# Patient Record
Sex: MALE | Race: WHITE | NOT HISPANIC OR LATINO | URBAN - METROPOLITAN AREA
[De-identification: names, ages, dates, MRNs, and addresses within clinical notes are randomized per-mention and may not be internally consistent; named-entity substitution may affect disease eponyms.]

---

## 2022-09-30 ENCOUNTER — INPATIENT (INPATIENT)
Facility: HOSPITAL | Age: 47
LOS: 5 days | Discharge: NURSING, REHAB OR LONG-TERM CARE | End: 2022-10-06
Attending: HOSPITALIST | Admitting: HOSPITALIST

## 2022-09-30 VITALS
SYSTOLIC BLOOD PRESSURE: 101 MMHG | RESPIRATION RATE: 20 BRPM | DIASTOLIC BLOOD PRESSURE: 60 MMHG | TEMPERATURE: 103 F | HEART RATE: 115 BPM | OXYGEN SATURATION: 98 %

## 2022-09-30 DIAGNOSIS — Z98.890 OTHER SPECIFIED POSTPROCEDURAL STATES: Chronic | ICD-10-CM

## 2022-09-30 DIAGNOSIS — Z29.9 ENCOUNTER FOR PROPHYLACTIC MEASURES, UNSPECIFIED: ICD-10-CM

## 2022-09-30 DIAGNOSIS — E11.9 TYPE 2 DIABETES MELLITUS WITHOUT COMPLICATIONS: ICD-10-CM

## 2022-09-30 DIAGNOSIS — Z98.1 ARTHRODESIS STATUS: Chronic | ICD-10-CM

## 2022-09-30 DIAGNOSIS — M54.9 DORSALGIA, UNSPECIFIED: ICD-10-CM

## 2022-09-30 DIAGNOSIS — N12 TUBULO-INTERSTITIAL NEPHRITIS, NOT SPECIFIED AS ACUTE OR CHRONIC: ICD-10-CM

## 2022-09-30 DIAGNOSIS — F32.9 MAJOR DEPRESSIVE DISORDER, SINGLE EPISODE, UNSPECIFIED: ICD-10-CM

## 2022-09-30 DIAGNOSIS — I10 ESSENTIAL (PRIMARY) HYPERTENSION: ICD-10-CM

## 2022-09-30 DIAGNOSIS — D64.9 ANEMIA, UNSPECIFIED: ICD-10-CM

## 2022-09-30 DIAGNOSIS — N39.0 URINARY TRACT INFECTION, SITE NOT SPECIFIED: ICD-10-CM

## 2022-09-30 DIAGNOSIS — N31.9 NEUROMUSCULAR DYSFUNCTION OF BLADDER, UNSPECIFIED: ICD-10-CM

## 2022-09-30 DIAGNOSIS — R77.8 OTHER SPECIFIED ABNORMALITIES OF PLASMA PROTEINS: ICD-10-CM

## 2022-09-30 DIAGNOSIS — I50.42 CHRONIC COMBINED SYSTOLIC (CONGESTIVE) AND DIASTOLIC (CONGESTIVE) HEART FAILURE: ICD-10-CM

## 2022-09-30 DIAGNOSIS — K21.9 GASTRO-ESOPHAGEAL REFLUX DISEASE WITHOUT ESOPHAGITIS: ICD-10-CM

## 2022-09-30 DIAGNOSIS — G47.33 OBSTRUCTIVE SLEEP APNEA (ADULT) (PEDIATRIC): ICD-10-CM

## 2022-09-30 LAB
A1C WITH ESTIMATED AVERAGE GLUCOSE RESULT: 7.9 % — HIGH (ref 4–5.6)
ALBUMIN SERPL ELPH-MCNC: 3 G/DL — LOW (ref 3.3–5)
ALBUMIN SERPL ELPH-MCNC: 3.3 G/DL — SIGNIFICANT CHANGE UP (ref 3.3–5)
ALP SERPL-CCNC: 108 U/L — SIGNIFICANT CHANGE UP (ref 40–120)
ALP SERPL-CCNC: 119 U/L — SIGNIFICANT CHANGE UP (ref 40–120)
ALT FLD-CCNC: 5 U/L — SIGNIFICANT CHANGE UP (ref 4–41)
ALT FLD-CCNC: 5 U/L — SIGNIFICANT CHANGE UP (ref 4–41)
ANION GAP SERPL CALC-SCNC: 10 MMOL/L — SIGNIFICANT CHANGE UP (ref 7–14)
ANION GAP SERPL CALC-SCNC: 9 MMOL/L — SIGNIFICANT CHANGE UP (ref 7–14)
APPEARANCE UR: ABNORMAL
APTT BLD: 36.6 SEC — HIGH (ref 27–36.3)
AST SERPL-CCNC: 11 U/L — SIGNIFICANT CHANGE UP (ref 4–40)
AST SERPL-CCNC: 13 U/L — SIGNIFICANT CHANGE UP (ref 4–40)
BACTERIA # UR AUTO: ABNORMAL
BASE EXCESS BLDV CALC-SCNC: -0.2 MMOL/L — SIGNIFICANT CHANGE UP (ref -2–3)
BASE EXCESS BLDV CALC-SCNC: 0.2 MMOL/L — SIGNIFICANT CHANGE UP (ref -2–3)
BASOPHILS # BLD AUTO: 0.02 K/UL — SIGNIFICANT CHANGE UP (ref 0–0.2)
BASOPHILS NFR BLD AUTO: 0.2 % — SIGNIFICANT CHANGE UP (ref 0–2)
BILIRUB SERPL-MCNC: 0.8 MG/DL — SIGNIFICANT CHANGE UP (ref 0.2–1.2)
BILIRUB SERPL-MCNC: 0.8 MG/DL — SIGNIFICANT CHANGE UP (ref 0.2–1.2)
BILIRUB UR-MCNC: NEGATIVE — SIGNIFICANT CHANGE UP
BLD GP AB SCN SERPL QL: POSITIVE — SIGNIFICANT CHANGE UP
BLOOD GAS VENOUS COMPREHENSIVE RESULT: SIGNIFICANT CHANGE UP
BUN SERPL-MCNC: 16 MG/DL — SIGNIFICANT CHANGE UP (ref 7–23)
BUN SERPL-MCNC: 16 MG/DL — SIGNIFICANT CHANGE UP (ref 7–23)
CA-I SERPL-SCNC: 1.09 MMOL/L — LOW (ref 1.15–1.33)
CALCIUM SERPL-MCNC: 8.1 MG/DL — LOW (ref 8.4–10.5)
CALCIUM SERPL-MCNC: 8.2 MG/DL — LOW (ref 8.4–10.5)
CHLORIDE BLDV-SCNC: 100 MMOL/L — SIGNIFICANT CHANGE UP (ref 96–108)
CHLORIDE BLDV-SCNC: 99 MMOL/L — SIGNIFICANT CHANGE UP (ref 96–108)
CHLORIDE SERPL-SCNC: 102 MMOL/L — SIGNIFICANT CHANGE UP (ref 98–107)
CHLORIDE SERPL-SCNC: 97 MMOL/L — LOW (ref 98–107)
CO2 BLDV-SCNC: 25.8 MMOL/L — SIGNIFICANT CHANGE UP (ref 22–26)
CO2 BLDV-SCNC: 28.6 MMOL/L — HIGH (ref 22–26)
CO2 SERPL-SCNC: 25 MMOL/L — SIGNIFICANT CHANGE UP (ref 22–31)
CO2 SERPL-SCNC: 25 MMOL/L — SIGNIFICANT CHANGE UP (ref 22–31)
COLOR SPEC: YELLOW — SIGNIFICANT CHANGE UP
COMMENT - URINE: SIGNIFICANT CHANGE UP
CREAT SERPL-MCNC: 0.68 MG/DL — SIGNIFICANT CHANGE UP (ref 0.5–1.3)
CREAT SERPL-MCNC: 0.73 MG/DL — SIGNIFICANT CHANGE UP (ref 0.5–1.3)
DIFF PNL FLD: ABNORMAL
EGFR: 114 ML/MIN/1.73M2 — SIGNIFICANT CHANGE UP
EGFR: 117 ML/MIN/1.73M2 — SIGNIFICANT CHANGE UP
EOSINOPHIL # BLD AUTO: 0.02 K/UL — SIGNIFICANT CHANGE UP (ref 0–0.5)
EOSINOPHIL NFR BLD AUTO: 0.2 % — SIGNIFICANT CHANGE UP (ref 0–6)
EPI CELLS # UR: 3 /HPF — SIGNIFICANT CHANGE UP (ref 0–5)
ESTIMATED AVERAGE GLUCOSE: 180 — SIGNIFICANT CHANGE UP
FLUAV AG NPH QL: SIGNIFICANT CHANGE UP
FLUBV AG NPH QL: SIGNIFICANT CHANGE UP
GAS PNL BLDV: 130 MMOL/L — LOW (ref 136–145)
GAS PNL BLDV: 134 MMOL/L — LOW (ref 136–145)
GAS PNL BLDV: SIGNIFICANT CHANGE UP
GLUCOSE BLDV-MCNC: 107 MG/DL — HIGH (ref 70–99)
GLUCOSE BLDV-MCNC: 97 MG/DL — SIGNIFICANT CHANGE UP (ref 70–99)
GLUCOSE SERPL-MCNC: 100 MG/DL — HIGH (ref 70–99)
GLUCOSE SERPL-MCNC: 108 MG/DL — HIGH (ref 70–99)
GLUCOSE UR QL: NEGATIVE — SIGNIFICANT CHANGE UP
HCO3 BLDV-SCNC: 25 MMOL/L — SIGNIFICANT CHANGE UP (ref 22–29)
HCO3 BLDV-SCNC: 27 MMOL/L — SIGNIFICANT CHANGE UP (ref 22–29)
HCT VFR BLD CALC: 31.2 % — LOW (ref 39–50)
HCT VFR BLD CALC: 32 % — LOW (ref 39–50)
HCT VFR BLDA CALC: 28 % — LOW (ref 39–51)
HCT VFR BLDA CALC: 28 % — LOW (ref 39–51)
HGB BLD CALC-MCNC: 9.2 G/DL — LOW (ref 13–17)
HGB BLD CALC-MCNC: 9.3 G/DL — LOW (ref 13–17)
HGB BLD-MCNC: 9.1 G/DL — LOW (ref 13–17)
HGB BLD-MCNC: 9.1 G/DL — LOW (ref 13–17)
IANC: 6.38 K/UL — SIGNIFICANT CHANGE UP (ref 1.8–7.4)
IMM GRANULOCYTES NFR BLD AUTO: 0.5 % — SIGNIFICANT CHANGE UP (ref 0–0.9)
INR BLD: 1.4 RATIO — HIGH (ref 0.88–1.16)
KETONES UR-MCNC: NEGATIVE — SIGNIFICANT CHANGE UP
LACTATE BLDV-MCNC: 1.1 MMOL/L — SIGNIFICANT CHANGE UP (ref 0.5–2)
LACTATE BLDV-MCNC: 1.1 MMOL/L — SIGNIFICANT CHANGE UP (ref 0.5–2)
LEUKOCYTE ESTERASE UR-ACNC: ABNORMAL
LYMPHOCYTES # BLD AUTO: 1.05 K/UL — SIGNIFICANT CHANGE UP (ref 1–3.3)
LYMPHOCYTES # BLD AUTO: 12.1 % — LOW (ref 13–44)
MCHC RBC-ENTMCNC: 23.3 PG — LOW (ref 27–34)
MCHC RBC-ENTMCNC: 23.4 PG — LOW (ref 27–34)
MCHC RBC-ENTMCNC: 28.4 GM/DL — LOW (ref 32–36)
MCHC RBC-ENTMCNC: 29.2 GM/DL — LOW (ref 32–36)
MCV RBC AUTO: 80.2 FL — SIGNIFICANT CHANGE UP (ref 80–100)
MCV RBC AUTO: 82.1 FL — SIGNIFICANT CHANGE UP (ref 80–100)
MONOCYTES # BLD AUTO: 1.16 K/UL — HIGH (ref 0–0.9)
MONOCYTES NFR BLD AUTO: 13.4 % — SIGNIFICANT CHANGE UP (ref 2–14)
NEUTROPHILS # BLD AUTO: 6.38 K/UL — SIGNIFICANT CHANGE UP (ref 1.8–7.4)
NEUTROPHILS NFR BLD AUTO: 73.6 % — SIGNIFICANT CHANGE UP (ref 43–77)
NITRITE UR-MCNC: POSITIVE
NRBC # BLD: 0 /100 WBCS — SIGNIFICANT CHANGE UP (ref 0–0)
NRBC # BLD: 0 /100 WBCS — SIGNIFICANT CHANGE UP (ref 0–0)
NRBC # FLD: 0 K/UL — SIGNIFICANT CHANGE UP (ref 0–0)
NRBC # FLD: 0 K/UL — SIGNIFICANT CHANGE UP (ref 0–0)
PCO2 BLDV: 38 MMHG — LOW (ref 42–55)
PCO2 BLDV: 56 MMHG — HIGH (ref 42–55)
PH BLDV: 7.29 — LOW (ref 7.32–7.43)
PH BLDV: 7.42 — SIGNIFICANT CHANGE UP (ref 7.32–7.43)
PH UR: 8.5 — HIGH (ref 5–8)
PLATELET # BLD AUTO: 168 K/UL — SIGNIFICANT CHANGE UP (ref 150–400)
PLATELET # BLD AUTO: 184 K/UL — SIGNIFICANT CHANGE UP (ref 150–400)
PO2 BLDV: 29 MMHG — SIGNIFICANT CHANGE UP
PO2 BLDV: 55 MMHG — SIGNIFICANT CHANGE UP
POTASSIUM BLDV-SCNC: 3.3 MMOL/L — LOW (ref 3.5–5.1)
POTASSIUM BLDV-SCNC: 4.2 MMOL/L — SIGNIFICANT CHANGE UP (ref 3.5–5.1)
POTASSIUM SERPL-MCNC: 3.4 MMOL/L — LOW (ref 3.5–5.3)
POTASSIUM SERPL-MCNC: 4.3 MMOL/L — SIGNIFICANT CHANGE UP (ref 3.5–5.3)
POTASSIUM SERPL-SCNC: 3.4 MMOL/L — LOW (ref 3.5–5.3)
POTASSIUM SERPL-SCNC: 4.3 MMOL/L — SIGNIFICANT CHANGE UP (ref 3.5–5.3)
PROT SERPL-MCNC: 7.2 G/DL — SIGNIFICANT CHANGE UP (ref 6–8.3)
PROT SERPL-MCNC: 7.6 G/DL — SIGNIFICANT CHANGE UP (ref 6–8.3)
PROT UR-MCNC: ABNORMAL
PROTHROM AB SERPL-ACNC: 16.3 SEC — HIGH (ref 10.5–13.4)
RBC # BLD: 3.89 M/UL — LOW (ref 4.2–5.8)
RBC # BLD: 3.9 M/UL — LOW (ref 4.2–5.8)
RBC # FLD: 18.2 % — HIGH (ref 10.3–14.5)
RBC # FLD: 18.3 % — HIGH (ref 10.3–14.5)
RBC CASTS # UR COMP ASSIST: 4 /HPF — SIGNIFICANT CHANGE UP (ref 0–4)
RH IG SCN BLD-IMP: POSITIVE — SIGNIFICANT CHANGE UP
RSV RNA NPH QL NAA+NON-PROBE: SIGNIFICANT CHANGE UP
SAO2 % BLDV: 39.4 % — SIGNIFICANT CHANGE UP
SAO2 % BLDV: 85.1 % — SIGNIFICANT CHANGE UP
SARS-COV-2 RNA SPEC QL NAA+PROBE: SIGNIFICANT CHANGE UP
SODIUM SERPL-SCNC: 132 MMOL/L — LOW (ref 135–145)
SODIUM SERPL-SCNC: 136 MMOL/L — SIGNIFICANT CHANGE UP (ref 135–145)
SP GR SPEC: 1.02 — SIGNIFICANT CHANGE UP (ref 1.01–1.05)
TROPONIN T, HIGH SENSITIVITY RESULT: 58 NG/L — CRITICAL HIGH
TROPONIN T, HIGH SENSITIVITY RESULT: 66 NG/L — CRITICAL HIGH
UROBILINOGEN FLD QL: SIGNIFICANT CHANGE UP
WBC # BLD: 6.96 K/UL — SIGNIFICANT CHANGE UP (ref 3.8–10.5)
WBC # BLD: 8.67 K/UL — SIGNIFICANT CHANGE UP (ref 3.8–10.5)
WBC # FLD AUTO: 6.96 K/UL — SIGNIFICANT CHANGE UP (ref 3.8–10.5)
WBC # FLD AUTO: 8.67 K/UL — SIGNIFICANT CHANGE UP (ref 3.8–10.5)
WBC UR QL: 22 /HPF — HIGH (ref 0–5)

## 2022-09-30 PROCEDURE — 99285 EMERGENCY DEPT VISIT HI MDM: CPT | Mod: 25

## 2022-09-30 PROCEDURE — 71045 X-RAY EXAM CHEST 1 VIEW: CPT | Mod: 26

## 2022-09-30 PROCEDURE — 86077 PHYS BLOOD BANK SERV XMATCH: CPT

## 2022-09-30 PROCEDURE — 99223 1ST HOSP IP/OBS HIGH 75: CPT

## 2022-09-30 PROCEDURE — 93010 ELECTROCARDIOGRAM REPORT: CPT

## 2022-09-30 RX ORDER — BACLOFEN 100 %
5 POWDER (GRAM) MISCELLANEOUS EVERY 8 HOURS
Refills: 0 | Status: DISCONTINUED | OUTPATIENT
Start: 2022-09-30 | End: 2022-10-03

## 2022-09-30 RX ORDER — TRAMADOL HYDROCHLORIDE 50 MG/1
50 TABLET ORAL EVERY 8 HOURS
Refills: 0 | Status: DISCONTINUED | OUTPATIENT
Start: 2022-09-30 | End: 2022-10-06

## 2022-09-30 RX ORDER — LANOLIN ALCOHOL/MO/W.PET/CERES
6 CREAM (GRAM) TOPICAL AT BEDTIME
Refills: 0 | Status: DISCONTINUED | OUTPATIENT
Start: 2022-09-30 | End: 2022-10-06

## 2022-09-30 RX ORDER — SODIUM CHLORIDE 9 MG/ML
500 INJECTION INTRAMUSCULAR; INTRAVENOUS; SUBCUTANEOUS ONCE
Refills: 0 | Status: COMPLETED | OUTPATIENT
Start: 2022-09-30 | End: 2022-09-30

## 2022-09-30 RX ORDER — ENOXAPARIN SODIUM 100 MG/ML
40 INJECTION SUBCUTANEOUS EVERY 24 HOURS
Refills: 0 | Status: DISCONTINUED | OUTPATIENT
Start: 2022-09-30 | End: 2022-10-06

## 2022-09-30 RX ORDER — OXYCODONE HYDROCHLORIDE 5 MG/1
30 TABLET ORAL EVERY 6 HOURS
Refills: 0 | Status: DISCONTINUED | OUTPATIENT
Start: 2022-09-30 | End: 2022-10-06

## 2022-09-30 RX ORDER — DIVALPROEX SODIUM 500 MG/1
500 TABLET, DELAYED RELEASE ORAL EVERY 12 HOURS
Refills: 0 | Status: DISCONTINUED | OUTPATIENT
Start: 2022-09-30 | End: 2022-10-06

## 2022-09-30 RX ORDER — INSULIN ASPART 100 [IU]/ML
10 INJECTION, SOLUTION SUBCUTANEOUS
Qty: 0 | Refills: 0 | DISCHARGE

## 2022-09-30 RX ORDER — SODIUM CHLORIDE 9 MG/ML
1000 INJECTION, SOLUTION INTRAVENOUS
Refills: 0 | Status: DISCONTINUED | OUTPATIENT
Start: 2022-09-30 | End: 2022-10-06

## 2022-09-30 RX ORDER — INSULIN LISPRO 100/ML
10 VIAL (ML) SUBCUTANEOUS
Refills: 0 | Status: DISCONTINUED | OUTPATIENT
Start: 2022-09-30 | End: 2022-09-30

## 2022-09-30 RX ORDER — GLUCAGON INJECTION, SOLUTION 0.5 MG/.1ML
1 INJECTION, SOLUTION SUBCUTANEOUS ONCE
Refills: 0 | Status: DISCONTINUED | OUTPATIENT
Start: 2022-09-30 | End: 2022-10-06

## 2022-09-30 RX ORDER — GABAPENTIN 400 MG/1
400 CAPSULE ORAL EVERY 8 HOURS
Refills: 0 | Status: DISCONTINUED | OUTPATIENT
Start: 2022-09-30 | End: 2022-10-06

## 2022-09-30 RX ORDER — FUROSEMIDE 40 MG
80 TABLET ORAL DAILY
Refills: 0 | Status: DISCONTINUED | OUTPATIENT
Start: 2022-09-30 | End: 2022-10-06

## 2022-09-30 RX ORDER — CYCLOBENZAPRINE HYDROCHLORIDE 10 MG/1
10 TABLET, FILM COATED ORAL
Refills: 0 | Status: DISCONTINUED | OUTPATIENT
Start: 2022-09-30 | End: 2022-10-06

## 2022-09-30 RX ORDER — CHOLECALCIFEROL (VITAMIN D3) 125 MCG
1000 CAPSULE ORAL DAILY
Refills: 0 | Status: DISCONTINUED | OUTPATIENT
Start: 2022-09-30 | End: 2022-10-06

## 2022-09-30 RX ORDER — FENTANYL CITRATE 50 UG/ML
100 INJECTION INTRAVENOUS ONCE
Refills: 0 | Status: DISCONTINUED | OUTPATIENT
Start: 2022-09-30 | End: 2022-09-30

## 2022-09-30 RX ORDER — KETOROLAC TROMETHAMINE 30 MG/ML
30 SYRINGE (ML) INJECTION ONCE
Refills: 0 | Status: DISCONTINUED | OUTPATIENT
Start: 2022-09-30 | End: 2022-09-30

## 2022-09-30 RX ORDER — DULOXETINE HYDROCHLORIDE 30 MG/1
60 CAPSULE, DELAYED RELEASE ORAL DAILY
Refills: 0 | Status: DISCONTINUED | OUTPATIENT
Start: 2022-09-30 | End: 2022-10-06

## 2022-09-30 RX ORDER — ACETAMINOPHEN 500 MG
650 TABLET ORAL EVERY 6 HOURS
Refills: 0 | Status: DISCONTINUED | OUTPATIENT
Start: 2022-09-30 | End: 2022-10-06

## 2022-09-30 RX ORDER — SODIUM CHLORIDE 9 MG/ML
250 INJECTION INTRAMUSCULAR; INTRAVENOUS; SUBCUTANEOUS ONCE
Refills: 0 | Status: COMPLETED | OUTPATIENT
Start: 2022-09-30 | End: 2022-09-30

## 2022-09-30 RX ORDER — DEXTROSE 50 % IN WATER 50 %
25 SYRINGE (ML) INTRAVENOUS ONCE
Refills: 0 | Status: DISCONTINUED | OUTPATIENT
Start: 2022-09-30 | End: 2022-10-06

## 2022-09-30 RX ORDER — INSULIN LISPRO 100/ML
VIAL (ML) SUBCUTANEOUS AT BEDTIME
Refills: 0 | Status: DISCONTINUED | OUTPATIENT
Start: 2022-09-30 | End: 2022-10-06

## 2022-09-30 RX ORDER — INSULIN GLARGINE 100 [IU]/ML
60 INJECTION, SOLUTION SUBCUTANEOUS AT BEDTIME
Refills: 0 | Status: DISCONTINUED | OUTPATIENT
Start: 2022-09-30 | End: 2022-09-30

## 2022-09-30 RX ORDER — FENTANYL CITRATE 50 UG/ML
50 INJECTION INTRAVENOUS ONCE
Refills: 0 | Status: DISCONTINUED | OUTPATIENT
Start: 2022-09-30 | End: 2022-09-30

## 2022-09-30 RX ORDER — ERTAPENEM SODIUM 1 G/1
1000 INJECTION, POWDER, LYOPHILIZED, FOR SOLUTION INTRAMUSCULAR; INTRAVENOUS EVERY 24 HOURS
Refills: 0 | Status: DISCONTINUED | OUTPATIENT
Start: 2022-10-01 | End: 2022-10-06

## 2022-09-30 RX ORDER — SENNA PLUS 8.6 MG/1
2 TABLET ORAL AT BEDTIME
Refills: 0 | Status: DISCONTINUED | OUTPATIENT
Start: 2022-09-30 | End: 2022-10-03

## 2022-09-30 RX ORDER — LANOLIN ALCOHOL/MO/W.PET/CERES
5 CREAM (GRAM) TOPICAL AT BEDTIME
Refills: 0 | Status: DISCONTINUED | OUTPATIENT
Start: 2022-09-30 | End: 2022-09-30

## 2022-09-30 RX ORDER — ACETAMINOPHEN 500 MG
1000 TABLET ORAL ONCE
Refills: 0 | Status: COMPLETED | OUTPATIENT
Start: 2022-09-30 | End: 2022-09-30

## 2022-09-30 RX ORDER — METOPROLOL TARTRATE 50 MG
25 TABLET ORAL
Refills: 0 | Status: DISCONTINUED | OUTPATIENT
Start: 2022-09-30 | End: 2022-10-06

## 2022-09-30 RX ORDER — DEXTROSE 50 % IN WATER 50 %
25 SYRINGE (ML) INTRAVENOUS ONCE
Refills: 0 | Status: COMPLETED | OUTPATIENT
Start: 2022-09-30 | End: 2022-09-30

## 2022-09-30 RX ORDER — DEXTROSE 50 % IN WATER 50 %
12.5 SYRINGE (ML) INTRAVENOUS ONCE
Refills: 0 | Status: DISCONTINUED | OUTPATIENT
Start: 2022-09-30 | End: 2022-10-06

## 2022-09-30 RX ORDER — DEXTROSE 50 % IN WATER 50 %
15 SYRINGE (ML) INTRAVENOUS ONCE
Refills: 0 | Status: DISCONTINUED | OUTPATIENT
Start: 2022-09-30 | End: 2022-10-06

## 2022-09-30 RX ORDER — INSULIN LISPRO 100/ML
VIAL (ML) SUBCUTANEOUS
Refills: 0 | Status: DISCONTINUED | OUTPATIENT
Start: 2022-09-30 | End: 2022-10-06

## 2022-09-30 RX ORDER — INSULIN GLARGINE 100 [IU]/ML
30 INJECTION, SOLUTION SUBCUTANEOUS AT BEDTIME
Refills: 0 | Status: DISCONTINUED | OUTPATIENT
Start: 2022-09-30 | End: 2022-10-01

## 2022-09-30 RX ORDER — ERTAPENEM SODIUM 1 G/1
1000 INJECTION, POWDER, LYOPHILIZED, FOR SOLUTION INTRAMUSCULAR; INTRAVENOUS ONCE
Refills: 0 | Status: COMPLETED | OUTPATIENT
Start: 2022-09-30 | End: 2022-09-30

## 2022-09-30 RX ADMIN — GABAPENTIN 400 MILLIGRAM(S): 400 CAPSULE ORAL at 21:17

## 2022-09-30 RX ADMIN — ERTAPENEM SODIUM 120 MILLIGRAM(S): 1 INJECTION, POWDER, LYOPHILIZED, FOR SOLUTION INTRAMUSCULAR; INTRAVENOUS at 05:40

## 2022-09-30 RX ADMIN — Medication 6 MILLIGRAM(S): at 21:18

## 2022-09-30 RX ADMIN — Medication 30 MILLIGRAM(S): at 11:05

## 2022-09-30 RX ADMIN — DIVALPROEX SODIUM 500 MILLIGRAM(S): 500 TABLET, DELAYED RELEASE ORAL at 19:22

## 2022-09-30 RX ADMIN — FENTANYL CITRATE 50 MICROGRAM(S): 50 INJECTION INTRAVENOUS at 04:04

## 2022-09-30 RX ADMIN — INSULIN GLARGINE 30 UNIT(S): 100 INJECTION, SOLUTION SUBCUTANEOUS at 21:19

## 2022-09-30 RX ADMIN — TRAMADOL HYDROCHLORIDE 50 MILLIGRAM(S): 50 TABLET ORAL at 14:54

## 2022-09-30 RX ADMIN — OXYCODONE HYDROCHLORIDE 30 MILLIGRAM(S): 5 TABLET ORAL at 19:23

## 2022-09-30 RX ADMIN — FENTANYL CITRATE 50 MICROGRAM(S): 50 INJECTION INTRAVENOUS at 11:04

## 2022-09-30 RX ADMIN — FENTANYL CITRATE 100 MICROGRAM(S): 50 INJECTION INTRAVENOUS at 06:18

## 2022-09-30 RX ADMIN — GABAPENTIN 400 MILLIGRAM(S): 400 CAPSULE ORAL at 15:33

## 2022-09-30 RX ADMIN — ENOXAPARIN SODIUM 40 MILLIGRAM(S): 100 INJECTION SUBCUTANEOUS at 15:32

## 2022-09-30 RX ADMIN — TRAMADOL HYDROCHLORIDE 50 MILLIGRAM(S): 50 TABLET ORAL at 21:18

## 2022-09-30 RX ADMIN — TRAMADOL HYDROCHLORIDE 50 MILLIGRAM(S): 50 TABLET ORAL at 19:16

## 2022-09-30 RX ADMIN — Medication 5 MILLIGRAM(S): at 21:18

## 2022-09-30 RX ADMIN — FENTANYL CITRATE 50 MICROGRAM(S): 50 INJECTION INTRAVENOUS at 04:49

## 2022-09-30 RX ADMIN — Medication 5 MILLIGRAM(S): at 15:33

## 2022-09-30 RX ADMIN — Medication 30 MILLIGRAM(S): at 05:49

## 2022-09-30 RX ADMIN — Medication 25 GRAM(S): at 11:50

## 2022-09-30 RX ADMIN — Medication 30 MILLIGRAM(S): at 19:14

## 2022-09-30 RX ADMIN — Medication 1000 MILLIGRAM(S): at 11:04

## 2022-09-30 RX ADMIN — FENTANYL CITRATE 100 MICROGRAM(S): 50 INJECTION INTRAVENOUS at 11:04

## 2022-09-30 RX ADMIN — SODIUM CHLORIDE 500 MILLILITER(S): 9 INJECTION INTRAMUSCULAR; INTRAVENOUS; SUBCUTANEOUS at 11:05

## 2022-09-30 RX ADMIN — SENNA PLUS 2 TABLET(S): 8.6 TABLET ORAL at 21:18

## 2022-09-30 RX ADMIN — SODIUM CHLORIDE 250 MILLILITER(S): 9 INJECTION INTRAMUSCULAR; INTRAVENOUS; SUBCUTANEOUS at 05:30

## 2022-09-30 RX ADMIN — Medication 30 MILLIGRAM(S): at 14:53

## 2022-09-30 RX ADMIN — CYCLOBENZAPRINE HYDROCHLORIDE 10 MILLIGRAM(S): 10 TABLET, FILM COATED ORAL at 19:22

## 2022-09-30 RX ADMIN — CYCLOBENZAPRINE HYDROCHLORIDE 10 MILLIGRAM(S): 10 TABLET, FILM COATED ORAL at 21:18

## 2022-09-30 RX ADMIN — SODIUM CHLORIDE 250 MILLILITER(S): 9 INJECTION INTRAMUSCULAR; INTRAVENOUS; SUBCUTANEOUS at 03:25

## 2022-09-30 RX ADMIN — Medication 400 MILLIGRAM(S): at 03:25

## 2022-09-30 RX ADMIN — ERTAPENEM SODIUM 1000 MILLIGRAM(S): 1 INJECTION, POWDER, LYOPHILIZED, FOR SOLUTION INTRAMUSCULAR; INTRAVENOUS at 11:04

## 2022-09-30 RX ADMIN — SODIUM CHLORIDE 500 MILLILITER(S): 9 INJECTION INTRAMUSCULAR; INTRAVENOUS; SUBCUTANEOUS at 05:30

## 2022-09-30 RX ADMIN — Medication 650 MILLIGRAM(S): at 19:22

## 2022-09-30 NOTE — ED PROVIDER NOTE - OBJECTIVE STATEMENT
47 year old M with PMH paraplegia, opioid dependence and withdrawal, GERD, neuromuscular dysfunction of bladder with chronic indwelling corrigan, DM2, MDD, diastolic and systolic CHF, SALLIE, HTN, ileostomy, sacral ulcer [facility]. Patient states he has chronic pain. Is on tramadol which does not work. Had previously been on oxycodone but was taken off. Has chronic indwelling corrigan that was last exchanged 3 weeks ago, states he has been seeing pus in it. Also notes increased output in his ostomy. Denies CP, SOB, palpitations, N/V. 47 year old M with PMH paraplegia, opioid dependence and withdrawal, GERD, neuromuscular dysfunction of bladder with chronic indwelling corrigan, DM2, MDD, diastolic and systolic CHF, SALLIE, HTN, ileostomy, sacral ulcer [facility]. Patient states he has chronic pain. Is on tramadol which does not work. Had previously been on oxycodone but was taken off. Has chronic indwelling corrigan that was last exchanged 3 weeks ago, states he has been seeing pus in it. Also notes increased output in his ostomy. Denies CP, SOB, palpitations, N/V. Symptoms similar to UTI/pyelonephritis in the past. States has been told he has "resistant bacteria" in his urine but unsure of what antibiotic has worked in the past. No palliative or provocative factors. No other current complaints at this time.

## 2022-09-30 NOTE — H&P ADULT - NEGATIVE GENERAL GENITOURINARY SYMPTOMS
due to neuromuscular dysfunction of bladder patient does not feel anything/no renal colic/no incontinence/no dysuria/no urinary hesitancy/normal urinary frequency/no nocturia

## 2022-09-30 NOTE — H&P ADULT - NEGATIVE ENMT SYMPTOMS
no hearing difficulty/no ear pain/no tinnitus/no vertigo/no abnormal taste sensation/no dry mouth/no throat pain

## 2022-09-30 NOTE — PHYSICAL THERAPY INITIAL EVALUATION ADULT - NSPTDISCHREC_GEN_A_CORE
return to rehab, if patient wants to go home will need 24/7 home health. Patient is at his baseline, will not keep on PT program at this time.

## 2022-09-30 NOTE — H&P ADULT - GASTROINTESTINAL
RLQ colostomy present/normal/soft/nondistended/normal active bowel sounds/no guarding/no rigidity details…

## 2022-09-30 NOTE — ED ADULT TRIAGE NOTE - CHIEF COMPLAINT QUOTE
Pt from [facility], c/o generalized back pain, b/l flank pain and chills x1 day. PMH DM2, CHF Pt from [facility], c/o generalized back pain, b/l flank pain and chills x1 day. Arrived w/ 20FR Sol Medina. PMH DM2, CHF

## 2022-09-30 NOTE — ED ADULT NURSE REASSESSMENT NOTE - NS ED NURSE REASSESS COMMENT FT1
At 1000 Pt pale and diaphoretic showing sinus tach in the 140s on the cardiac monitor with a BP of 85/51. Spoke with ADARSH Solano. 1 AMP of D50% given. VS reassessed at 1003 with a HR of 100 and a BP of 102/50. Medina in place, size At 1000 Pt pale and diaphoretic showing sinus tach in the 140s on the cardiac monitor with a BP of 85/51. Spoke with ADARSH Solano. 1 AMP of D50% given. VS reassessed at 1003 with a HR of 100 and a BP of 102/50. 20 F corrigan in place, 250mL voided, urine cloudy and purulent, draining by gravity to collection bag. Colostomy in place in RUQ with minimal stool in bag, skin around dressing clean, dry, and intact.

## 2022-09-30 NOTE — ED PROVIDER NOTE - NSICDXPASTMEDICALHX_GEN_ALL_CORE_FT
PAST MEDICAL HISTORY:  Chronic CHF     DM (diabetes mellitus)     HTN (hypertension)     Paraplegic spinal paralysis

## 2022-09-30 NOTE — ED ADULT NURSE NOTE - OBJECTIVE STATEMENT
48 y/o M presents to ED room 4 A&Ox4 and nonambulatory, c/o flank pain, fevers, and back pain. pt is in a rehab center d/t home care being revoked. pt is a paraplegic. pt has had B/L flank pain, back pain and fevers for "a few days." as per pt, rehab center has not been listening to eddie so he called EMS. pt has 20F corrigan catheter placed 3 weeks ago, draining sediment. catheter insertion site is red and inflamed, yellow discharge noted. MD aware. as per pt, has a pressure ulcer on sacrum. endorses SOB, back pain, fevers, chills. denies c/p, N,V,D. abd soft and non distended. pt is tachycardic and febrile, septic workup initiated. 18G placed to R forearm. labs drawn and sent. awaiting results. safety maintained, side rails up. 48 y/o M presents to ED room 4 A&Ox4 and nonambulatory, c/o flank pain, fevers, and back pain. pt is in a rehab center d/t home care being revoked. pt is a paraplegic. pt has had B/L flank pain, back pain and fevers for "a few days." as per pt, rehab center has not been listening to eddie so he called EMS. pt has 20F corrigan catheter placed 3 weeks ago, draining sediment. catheter insertion site is red and inflamed, yellow discharge noted. MD aware. colostomy bag noted on R abd, stoma beefy red, draining well. as per pt, has a pressure ulcer on sacrum. endorses SOB, back pain, fevers, chills. denies c/p, N,V,D. abd soft and non distended. pt is tachycardic and febrile, septic workup initiated. 18G placed to R forearm. labs drawn and sent. awaiting results. safety maintained, side rails up.

## 2022-09-30 NOTE — H&P ADULT - PROBLEM SELECTOR PLAN 3
- h/o combined systolic/diastolic HF +1 pitting edema on Lasix 80 mg BID, Spironolactone, Metoprolol Tartrate 25 mg BID at rehab  - unknown status of CHF  - echocardiogram for assessment of EF  - continue Lasix 80 mg BID for now  - Check CBC, CMP, MAG, PHOS, TSH, FLP, HgA1C, BNP.  - Strict I&Os, monitor daily weights, 1500 cc fluid restriction, sodium restriction.  - CHF consult if needed

## 2022-09-30 NOTE — ED ADULT NURSE NOTE - CHIEF COMPLAINT QUOTE
Pt from [facility], c/o generalized back pain, b/l flank pain and chills x1 day. Arrived w/ 20FR Sol Medina. PMH DM2, CHF

## 2022-09-30 NOTE — H&P ADULT - NEGATIVE GASTROINTESTINAL SYMPTOMS
stated increased output on colostomy/no nausea/no vomiting/no diarrhea/no constipation/no melena/no hematochezia

## 2022-09-30 NOTE — H&P ADULT - PROBLEM SELECTOR PLAN 2
Patient with elevated Troponin of 58, 66  EKG , RBBB  Serial EKGs and cardiac enzymes  If EKG changes TWI /STD /RAEGAN or continued chest pain Call cards for consult  Avoid morphine, nitro with inferior ischemia  TTE for wall motion evaluation and EF  CXR - The base of the bilateral lungs is not imaged. Within the visualized portions of the lungs, there is no consolidation. No pneumothorax. No large pleural effusion within the limitations of exam.

## 2022-09-30 NOTE — H&P ADULT - PROBLEM SELECTOR PROBLEM 3
Chronic combined systolic and diastolic heart failure Department of Internal Medicine  Section of Endocrinology   [address]    [address] , [address] [place], [address]  Office Phone Isabella Diez [address]  ([address])  ([address])  [address] Pradip Butts MD, Fellow of [employer] of Endocrinology   Progress Note    Admit Date: 4/10/2019    Reviewed the chart of the last 24 hours:   SUBJECTIVE:   No chief complaint on file.     Encephalopathy [G93.40]   Patient Active Problem List   Diagnosis Code    CHF (congestive heart failure) ([facility] I50.9    DM type 2 causing CKD stage 5 ([facility]) E11.22, N18.5    Essential hypertension I10    CKD (chronic kidney disease) stage 4, GFR 15-29 ml/min ([facility]) N18.4    Coronary artery disease involving native coronary artery of native heart with angina pectoris ([facility]) I25.119    Anemia associated with chronic renal failure D63.1    Anemia of chronic kidney failure N18.9, D63.1    Coronary atherosclerosis I25.10    Diabetes mellitus ([address] E11.9    History of CVA (cerebrovascular accident) Z80.78    MI (myocardial infarction) ([address] I21.9    Cerebrovascular accident ([address] I63.9    Type 1 diabetes mellitus with hyperglycemia ([facility]) E10.65    Diabetes mellitus with hyperglycemia ([facility]) E11.65    Anemia in chronic kidney disease N18.9, D63.1    Symptomatic anemia D64.9    Bacterial pneumonia J15.9    Iron deficiency anemia D50.9    Encephalopathy G93.40    Severe malnutrition ([facility]) E43    NANCY (acute kidney injury) ([address] N17.9    Hypervolemia E87.70     <principal problem not specified>  4/10/2019  7:27 PM  Admission weight: 129 lb 8 oz (58.7 kg)  [id number]  [id number]  [id number]  He has been referred by  [unfilled] for evaluation of    Patient has no complaints    Medication side effects: none  Patient is eating 100%    Review of Systems  Review of Systems - General ROS: negative Psychological ROS: negative   Hematological and Lymphatic ROS: No history of blood clots or bleeding disorder. Respiratory ROS: no cough, shortness of breath, or wheezing   Cardiovascular ROS: no chest pain or dyspnea on exertion   Gastrointestinal ROS: no abdominal pain, change in bowel habits, or black or bloody stools   Genito-Urinary ROS: no dysuria, trouble voiding, or hematuria   Musculoskeletal ROS: negative   Neurological ROS: no TIA or stroke symptoms   Dermatological ROS: negative    Past Medical, Surgical, Family, Social and Allergy Histories:  I have reviewed the patient's medical history in detail and updated the computerized patient record. has a past medical history of Broken ankle, CAD (coronary artery disease), Cerebral artery occlusion with cerebral infarction [facility]), CHF (congestive heart failure) ([facility] Chronic kidney disease, Diabetes mellitus ([facility] Hyperlipidemia, and Hypertension. has a past surgical history that includes Appendectomy and Cardiac surgery (Oct. 2015). reports that he quit smoking about 3 years ago. He has a 45.00 pack-year smoking history. He has quit using smokeless tobacco. He reports that he drinks about 2.4 oz of alcohol per week. He reports that he does not use drugs. family history includes Cancer in his sister; Diabetes in his mother. He was adopted. Allergies   Allergen Reactions    Aranesp (Albumin Free) [Darbepoetin Damon] Hives     Patient tolerated 4/13/19.       Current Facility-Administered Medications   Medication Dose Route Frequency Provider Last Rate Last Dose    famotidine (PEPCID) tablet 20 mg  20 mg Oral Daily Benjamin Bernstein, APRN - CNP   20 mg at 04/16/19 8119    hydrALAZINE (APRESOLINE) tablet 50 mg  50 mg Oral TID Benjamin GuerreroAmandeep, APRN - CNP   50 mg at 04/16/19 3952    levothyroxine (SYNTHROID) tablet 100 mcg  100 mcg Oral Daily Janet Tracey MD   100 mcg at 04/16/19 0622    [Held by provider] bumetanide (BUMEX) tablet 1 mg  1 mg Oral Daily Swetha Kim MD   1 mg at 04/13/19 0835    docusate sodium (COLACE) capsule 100 mg  100 mg Oral Daily Swetha Kim MD   100 mg at 04/16/19 0927    insulin glargine (LANTUS) injection vial 20 Units  20 Units Subcutaneous Daily Juanis Anand MD   20 Units at 04/16/19 0923    insulin lispro (HUMALOG) injection vial 5-20 Units  5-20 Units Subcutaneous TID  Juanis Anand MD   8 Units at 04/14/19 1831    isosorbide mononitrate (IMDUR) extended release tablet 60 mg  60 mg Oral Daily Swetha Kim MD   60 mg at 04/15/19 2152    ferrous sulfate tablet 325 mg  325 mg Oral TID  Swetha Kim MD   325 mg at 04/16/19 0929    insulin lispro (HUMALOG) injection vial 0-6 Units  0-6 Units Subcutaneous TID  Keny Lentz PA-C   2 Units at 04/14/19 1836    insulin lispro (HUMALOG) injection vial 0-3 Units  0-3 Units Subcutaneous Nightly Keny Lentz PA-C   1 Units at 04/14/19 2209    [Held by provider] aspirin chewable tablet 81 mg  81 mg Oral Daily TAQUERIA PalomoC   81 mg at 04/15/19 2153    atorvastatin (LIPITOR) tablet 80 mg  80 mg Oral Daily TAQUERIA PalomoC   80 mg at 04/15/19 2152    [Held by provider] clopidogrel (PLAVIX) tablet 75 mg  75 mg Oral Daily TAQUERIA PalomoC   75 mg at 04/15/19 2153    metoprolol tartrate (LOPRESSOR) tablet 50 mg  50 mg Oral BID TAQUERIA PalomoC   50 mg at 04/16/19 6592    sodium chloride flush 0.9 % injection 10 mL  10 mL Intravenous 2 times per day ROSALINDA Palomo-C   10 mL at 04/16/19 6551    sodium chloride flush 0.9 % injection 10 mL  10 mL Intravenous PRN Keny Lentz PA-C        magnesium hydroxide (MILK OF MAGNESIA) 400 MG/5ML suspension 30 mL  30 mL Oral Daily PRN ROSALINDA Palomo-C        ondansetron [facility]) injection 4 mg  4 mg Intravenous Q6H PRN TAQUERIA PalomoC   4 mg at 04/14/19 2212    acetaminophen (TYLENOL) tablet 650 mg  650 mg Oral Q4H PRN Keny Lentz PA-C   650 mg at 04/14/19 2212    glucose (GLUTOSE) 40 % oral gel 15 g  15 g Oral PRN [employer], PA-C   15 g at 04/15/19 0631    dextrose 50 % solution 12.5 g  12.5 g Intravenous PRN [employer], PA-C   12.5 g at 04/11/19 0544    glucagon (rDNA) injection 1 mg  1 mg Intramuscular PRN Mela Mederos PA-C        dextrose 5 % solution  100 mL/hr Intravenous PRN [employer] PetroleumRUBY         Home Meds:   Prior to Admission medications    Medication Sig Start Date End Date Taking?  Authorizing Provider   Insulin Degludec (TRESIBA) 100 UNIT/ML SOLN Inject 13 Units into the skin nightly    Yes Historical Provider, MD   insulin regular (HUMULIN R;NOVOLIN R) 100 UNIT/ML injection Inject into the skin See Admin Instructions 1 unit for every 8 carbs consumed  In addition:In the evening, If BS>200   200-250= 1unit  251-300= 2 units  301-350=3 units   Yes Historical Provider, MD   hydrALAZINE (APRESOLINE) 50 MG tablet Take 1.5 tablets by mouth 3 times daily  Patient taking differently: Take 50 mg by mouth 3 times daily  11/25/18  Yes Jose Elias Green MD   isosorbide mononitrate (IMDUR) 30 MG CR tablet Take 30 mg by mouth daily   Yes Historical Provider, MD   atorvastatin (LIPITOR) 80 MG tablet Take 80 mg by mouth daily   Yes Historical Provider, MD   clopidogrel (PLAVIX) 75 MG tablet Take 75 mg by mouth daily   Yes Historical Provider, MD   pantoprazole sodium (PROTONIX) 40 MG PACK packet Take 40 mg by mouth 2 times daily (before meals)   Yes Historical Provider, MD   metoprolol (LOPRESSOR) 50 MG tablet Take 50 mg by mouth 2 times daily   Yes Historical Provider, MD   aspirin 81 MG tablet Take 81 mg by mouth daily   Yes Historical Provider, MD   levothyroxine (SYNTHROID) 25 MCG tablet Take 25 mcg by mouth Daily    Historical Provider, MD   nitroGLYCERIN (NITROSTAT) 0.4 MG SL tablet DISSOLVE 1 TABLET UNDER THE TONGUE EVERY 5 MIN AS NEEDED FOR CHEST PAIN 1/25/18   Historical Provider, MD   FREESTYLE LANCETS MISC  3/31/16   Historical Provider, MD   FREESTYLE INSULINX TEST strip  3/31/16   Historical Provider, MD     Scheduled Meds:   famotidine  20 mg Oral Daily    hydrALAZINE  50 mg Oral TID    levothyroxine  100 mcg Oral Daily    [Held by provider] bumetanide  1 mg Oral Daily    docusate sodium  100 mg Oral Daily    insulin glargine  20 Units Subcutaneous Daily    insulin lispro  5-20 Units Subcutaneous TID     isosorbide mononitrate  60 mg Oral Daily    ferrous sulfate  325 mg Oral TID WC    insulin lispro  0-6 Units Subcutaneous TID WC    insulin lispro  0-3 Units Subcutaneous Nightly    [Held by provider] aspirin  81 mg Oral Daily    atorvastatin  80 mg Oral Daily    [Held by provider] clopidogrel  75 mg Oral Daily    metoprolol tartrate  50 mg Oral BID    sodium chloride flush  10 mL Intravenous 2 times per day     Continuous Infusions:   dextrose       PRN Meds:sodium chloride flush, magnesium hydroxide, ondansetron, acetaminophen, glucose, dextrose, glucagon (rDNA), dextrose    OBJECTIVE        Physical    VITALS:  BP (!) 164/69   Pulse 78   Temp 98.5 °F (36.9 °C) (Oral)   Resp 16   Ht 5' 6\" (1.676 m)   Wt 125 lb 10.6 oz (57 kg)   SpO2 90%   BMI 20.28 kg/m²   CONSTITUTIONAL:  awake, alert, cooperative, no apparent distress, and appears stated age  LUNGS:  No increased work of breathing, good air exchange, clear to auscultation bilaterally, no crackles or wheezing  CARDIOVASCULAR:  Normal apical impulse, regular rate and rhythm, normal S1 and S2, no S3 or S4, and no murmur noted  ABDOMEN:  No scars, normal bowel sounds, soft, non-distended, non-tender, no masses palpated, no hepatosplenomegally    DATA  Results for Everton Milan (MRN [id number]) as of 4/16/2019 16:53   Ref. Range 4/12/2019 03:57 4/13/2019 04:27 4/14/2019 04:10 4/15/2019 04:28 4/16/2019 04:05   Creatinine Latest Ref Range: 0.4 - 1.2 mg/dL 3.6 (HH) 4.0 (HH) 4.2 (HH) 4.5 (HH) 4.4 (HH)   Results for John COOPER (MRN [id number]) as of 4/16/2019 16:53   Ref.  Range 4/13/2019 04:27 4/14/2019 04:10 4/15/2019 04:28 4/16/2019 04:05   Est, Glom Filt Rate Latest Units: ml/min/1.73m2 16 (A) 15 (A) 14 (A) 14 (A)   Results for Meera COOPER (MRN [id number]) as of 4/16/2019 16:53   Ref.  Range 4/12/2019 03:57 4/13/2019 04:27 4/14/2019 04:10 4/15/2019 04:28 4/16/2019 04:05   BUN Latest Ref Range: 7 - 22 mg/dL 39 (H) 52 (H) 57 (H) 61 (H) 60 (H)     TSH:    Lab Results   Component Value Date    .400 04/11/2019   No components found for: FREE T4No components found for: FREET3  RADIOLOGYREPORTS:    Lab Review  @[email or web address]@  [unfilled]  Lab Results   Component Value Date    .400 (H) 04/11/2019    S7ECNEB 51 (L) 04/10/2019    T4FREE 0.71 (L) 04/14/2019     No results found for: FREET4  No results found for: TESTOSTERONE  CBC:  Lab Results   Component Value Date    WBC 5.6 04/10/2019    RBC 3.83 04/10/2019    HGB 8.0 04/10/2019    HCT 28.7 04/10/2019    MCV 74.9 04/10/2019    MCH 20.9 04/10/2019    MCHC 27.9 04/10/2019    RDW 16.2 04/09/2016     04/10/2019    MPV 10.7 04/10/2019     CMP:    Lab Results   Component Value Date     04/16/2019    K 5.0 04/16/2019    K 6.1 04/10/2019    CL 98 04/16/2019    CO2 20 04/16/2019    BUN 60 04/16/2019    CREATININE 4.4 04/16/2019    LABGLOM 14 04/16/2019    GLUCOSE 189 04/16/2019    PROT 6.6 04/10/2019    LABALBU 3.6 04/10/2019    CALCIUM 8.3 04/16/2019    BILITOT 0.6 04/10/2019    ALKPHOS 129 04/10/2019    AST 62 04/10/2019    ALT 38 04/10/2019     Hepatic Function Panel:    Lab Results   Component Value Date    ALKPHOS 129 04/10/2019    ALT 38 04/10/2019    AST 62 04/10/2019    PROT 6.6 04/10/2019    BILITOT 0.6 04/10/2019    BILIDIR 0.3 04/10/2019    LABALBU 3.6 04/10/2019     Magnesium:    Lab Results   Component Value Date    MG 1.9 11/25/2018     PT/INR:    Lab Results   Component Value Date    PROTIME 12.5 08/27/2018    INR 1.01 04/10/2019     HgBA1c:    Lab Results   Component Value Date    LABA1C 7.3 04/10/2019 No results for input(s): CKTOTAL, CKMB, CKMBINDEX, TROPONINI in the last 72 hours.   FLP:  No results found for: TRIG, HDL, LDLCALC, LDLDIRECT, LABVLDL  DIET: DIET CARB CONTROL; Low Sodium (2 GM)  Impression:    Blood sugars are resonable    ASSESSMENT AND PLAN

## 2022-09-30 NOTE — ED PROVIDER NOTE - ATTENDING CONTRIBUTION TO CARE
I have personally performed a history and physical examination of the patient and discussed management with the resident as well as the patient.  I reviewed the resident's note and agree with the documented findings and plan of care.  I have authored and modified critical sections of the Provider Note, including but not limited to HPI, Physical Exam and MDM.    47 year old M with PMH paraplegia, opioid dependence and withdrawal, GERD, neuromuscular dysfunction of bladder with chronic indwelling corrigan, DM2, MDD, diastolic and systolic CHF, SALLIE, HTN, ileostomy, sacral ulcer [place] [facility] [place] [facility] [place] [facility]. Symptoms similar to UTI/pyelonephritis in the past. Likely pyelonephritis vs uti vs sepsis. States has been told he has "resistant bacteria" in his urine but unsure of what antibiotic has worked in the past. Will obtain cbc, cmp, vbg, lactate, bc, ua, uc. Fever control, symptomatic control prn. Ertapenem for possible urine resistance, no past documentation or cultures for sensitivity. Given CHF likely won't tolerate 30cc/kg NS bolus. IV fluids as tolerated at reassess. Symptomatic control prn.

## 2022-09-30 NOTE — H&P ADULT - PROBLEM SELECTOR PLAN 6
- home medications: Lantus 100 units BID, Novolog 10 units TID pre meal.   - Will decrease Lantus to 60 units QHS and continue with Admelog 10 units pre meal  - insulin coverage sliding scale   - FS AC & HS.  - Diet: Consistent Carbs.  - f/u AM HbA1c.

## 2022-09-30 NOTE — ED ADULT NURSE REASSESSMENT NOTE - NS ED NURSE REASSESS COMMENT FT1
Repeat labs drawn. Spoke with ADARSH Solano, holding off on Toradol and fluid bolus, awaiting results from labs.

## 2022-09-30 NOTE — ED PROVIDER NOTE - PHYSICAL EXAMINATION
GENERAL: Awake, alert, NAD  HEENT: NC/AT, moist mucous membranes, PERRL, EOMI  LUNGS: CTAB, no wheezes or crackles   CARDIAC: Tachycardic, no m/r/g  ABDOMEN: Soft, normal BS, non tender, non distended, no rebound, no guarding, ostomy site C/D/I, brown stool  BACK: No midline spinal tenderness, no CVA tenderness  : Medina in place with sediment, no hematuria or clots  EXT: No edema, no calf tenderness, 2+ DP pulses bilaterally, no deformities.  NEURO: A&Ox3. Moving upper extremities.   SKIN: Warm and dry. No rash.  PSYCH: Normal affect. GENERAL: Awake, alert, NAD  HEENT: NC/AT, moist mucous membranes, PERRL, EOMI  LUNGS: CTAB, no wheezes or crackles   CARDIAC: Tachycardic, no m/r/g  ABDOMEN: Soft, normal BS, non tender, non distended, no rebound, no guarding, ostomy site C/D/I, brown stool  BACK: No midline spinal tenderness, no CVA tenderness  : Medina in place with sediment, no hematuria or clots  EXT: No edema, no calf tenderness, 2+ DP pulses bilaterally, no deformities.  NEURO: A&Ox3. Moving upper extremities.   SKIN: Warm and dry. Sacral ulcer, unstageable  PSYCH: Normal affect.

## 2022-09-30 NOTE — H&P ADULT - SENSORY
[address] 
[address] [facility] DEPT 
[address] [place], [place] [address] [id number] 
[phone] Work/School Note Date: 9/12/2017 To Whom It May concern: 
 
Isabella Baez was seen and treated today in the emergency room by the following provider(s): 
Attending Provider: Jacoby Baker MD 
Physician Assistant: SONU Manley.   
 
Isabella Baez may return to school on 9/14/17 Sincerely, Armand Manley 
 
 
 
 grossly intact

## 2022-09-30 NOTE — PHYSICAL THERAPY INITIAL EVALUATION ADULT - PERTINENT HX OF CURRENT PROBLEM, REHAB EVAL
48 y/o male with PMH paraplegia, opioid dependence and withdrawal, GERD, neuromuscular dysfunction of bladder with chronic indwelling Medina DM2, MDD, diastolic and systolic CHF, SALLIE, HTN, ileostomy, sacral ulcer [facility] pain and flank pain admitted to medicine for the management of acute uti and back pain.

## 2022-09-30 NOTE — H&P ADULT - PROBLEM SELECTOR PLAN 10
- DVT prophylaxis: Lovenox 40mg SC QD  - Sacral Decubitus - Will obtain Wound Consult for treatment recommendation.

## 2022-09-30 NOTE — ED ADULT NURSE REASSESSMENT NOTE - NS ED NURSE REASSESS COMMENT FT1
pt c/o 10/10 back pain and discomfort. MDs made aware. medicated as per MD orders. pt remains on continuous monitor. safety maintained. awaiting further orders.

## 2022-09-30 NOTE — H&P ADULT - PROBLEM SELECTOR PLAN 4
- Pain control as clinically indicated - Continue with Gabapentin, Toradol, Baclofen and Cyclobenzaprine  - Will obtain chronic pain service consult  - Physical therapy and occupational therapy  - Discharge planning – anticipated discharge is subacute rehabilitation

## 2022-09-30 NOTE — ED PROVIDER NOTE - CLINICAL SUMMARY MEDICAL DECISION MAKING FREE TEXT BOX
47 year old M with PMH paraplegia, opioid dependence and withdrawal, GERD, neuromuscular dysfunction of bladder with chronic indwelling corrigan, DM2, MDD, diastolic and systolic CHF, SALLIE, HTN, ileostomy, sacral ulcer presenting from [facility] for fever and back pain. Patient tachycardic, hypotensive and febrile. Corrigan with sediment, unable to assess sacral ulcer. Suspect urinary source. No abdominal tenderness, will defer CT A/P at this time. Urine and blood cultures, 250mL fluids given CHF, tylenol, labs. Admit.

## 2022-09-30 NOTE — PHYSICAL THERAPY INITIAL EVALUATION ADULT - ADDITIONAL COMMENTS
Patient presents on this admission from rehab where he has been for 2 months. Patient is bedbound, non ambulatory. patient requires ignacio lift to get out of bed, unable to sit up on edge of bed. Patient used to live alone in apartment, patient has ignacio lift, hospital bed, and wheelchair at home.

## 2022-09-30 NOTE — H&P ADULT - NSICDXPASTMEDICALHX_GEN_ALL_CORE_FT
PAST MEDICAL HISTORY:  Chronic combined systolic and diastolic heart failure     Colostomy in place     DM (diabetes mellitus)     GERD (gastroesophageal reflux disease)     HTN (hypertension)     MDD (major depressive disorder)     Neuromuscular dysfunction of bladder     Opioid dependence with withdrawal     SALLIE (obstructive sleep apnea)     Paraplegic spinal paralysis     Sacral ulcer

## 2022-09-30 NOTE — H&P ADULT - NEGATIVE OPHTHALMOLOGIC SYMPTOMS
no diplopia/no photophobia/no blurred vision L/no blurred vision R/no discharge L/no discharge R/no irritation L/no irritation R/no loss of vision L/no loss of vision R

## 2022-09-30 NOTE — H&P ADULT - HISTORY OF PRESENT ILLNESS
46 y/o Male, with a PmHx paraplegia, opioid dependence and withdrawal, GERD, neuromuscular dysfunction of bladder w/ chronic indwelling Medina DM2, Major Depression Disorder, diastolic and systolic CHF, SALLIE on CPAP, HTN, colostomy, sacral ulcer, presenting from [facility] for back pain, flank pain and fever. He states that he has been dealing with chronic back pain for many years that was manageable until he checked in the rehab center 2 months ago. He attributes the recurrence of pain to being on a hard bed and turning and position minimally helps. Describing the pain as piercing; as if someone is digging a knife into his back, radiating from the lower back going up to the middle. He rates the pain 8/10 and feels that it is consistent throughout the day. A week ago he started to notice flank pain more so on the left side, described as a localized kicking pain also rated an 8/10 for most of the day. Patient states shortly after the catheter change he saw pus in the drain. Three to four days later he reports an onset of fever, in rehab with T-Max  of 100.4. He was given Tylenol in the morning and afternoon daily since the onset with minimal relief. He otherwise denies new weight loss/gain, headaches, loc, dizziness, numbness/tingling, chills, SOB, CP, palpitations n/v/, d/c.

## 2022-09-30 NOTE — ED ADULT NURSE REASSESSMENT NOTE - NS ED NURSE REASSESS COMMENT FT1
Spoke with ADARSH Solano regarding BG of 89. PO challenge implemented, pt tolerated well. Spoke with ADARSH Solano regarding BG of 89. PO challenge implemented, complex carbohydrates given, pt tolerated well. Pt A&Ox4, mental status improved.

## 2022-09-30 NOTE — H&P ADULT - MOTOR
patient is a paraplegic - has not strength in lower ext, but maintain +5 strength equal and b/l in upper ext.

## 2022-09-30 NOTE — ED ADULT NURSE REASSESSMENT NOTE - NS ED NURSE REASSESS COMMENT FT1
Received pt A&Ox4 sleeping comfortably, easily aroused to verbal stimulation. Pt is warm and skin color is normal for race.... Pt presents with 5 pressure ulcers: 1 pressure ulcer stage 2? on his lower back and sacral area spanning about 40% of his back side, 2nd pressure ulcer unstageable on his left lateral foot, 3rd pressure ulcer on Received pt A&Ox4 sleeping comfortably, easily aroused to verbal stimulation. Pt is warm and skin color is normal for race. Pt presents with 6 wounds:  1. wound stage 3 on his lower back and sacral area spanning about 40% of his back. Hospital bed provided, pt repositioned and dressing changed  2. wound unstageable on his left lateral foot  with eschar measuring 1x3  3. wound unstageable on right with eschar measuring 3x3. DSD changed  4. skin fold wound to the right lateral side of mid torso. cleansed with NS, moisture barrier provided.  5 skin fold wound to the right side of abdomen fold. cleansed with NS, moisture barrier provided.  6 unstable wound to the right plantar foot measuring 2x3. DSD changed.  Spoked with ADARSH Solano regarding pt's BiPAP/CPAP use, not indicated at this time.

## 2022-09-30 NOTE — ED PROVIDER NOTE - NS ED ROS FT
CONST: + fevers, no chills  EYES: no pain, no vision changes  ENT: no sore throat, no ear pain, no change in hearing  CV: no chest pain, no leg swelling  RESP: no shortness of breath, no cough  ABD: no abdominal pain, no nausea, no vomiting, no diarrhea  : no dysuria, no flank pain, no hematuria  MSK: + back pain, no extremity pain  NEURO: no headache or additional neurologic complaints  HEME: no easy bleeding  SKIN:  no rash CONST: + fevers, no chills  EYES: no pain, no vision changes  ENT: no sore throat, no ear pain, no change in hearing  CV: no chest pain, no leg swelling  RESP: no shortness of breath, no cough  ABD: no abdominal pain, no nausea, no vomiting, no diarrhea  : no dysuria, + flank pain, no hematuria  MSK: + back pain, no extremity pain  NEURO: no headache or additional neurologic complaints  HEME: no easy bleeding  SKIN:  no rash

## 2022-09-30 NOTE — ED ADULT NURSE NOTE - NSIMPLEMENTINTERV_GEN_ALL_ED
Implemented All Fall Risk Interventions:  [place] to call system. Call bell, personal items and telephone within reach. Instruct patient to call for assistance. Room bathroom lighting operational. Non-slip footwear when patient is off stretcher. Physically safe environment: no spills, clutter or unnecessary equipment. Stretcher in lowest position, wheels locked, appropriate side rails in place. Provide visual cue, wrist band, yellow gown, etc. Monitor gait and stability. Monitor for mental status changes and reorient to person, place, and time. Review medications for side effects contributing to fall risk. Reinforce activity limits and safety measures with patient and family.

## 2022-09-30 NOTE — H&P ADULT - PROBLEM SELECTOR PLAN 5
Patient with H/H 9.1/31.2 - Patient states he had blood transfusion approximately 6 months ago due to anemia- unclear where the source of bleeding was located. As per patient, it was sacral decubitus area  Anemia work up  (iron studies, B12, Folate, retic.)   Will monitor H/H.    Pt currently asymptomatic and hemodynamically stable.   Guaiac stools.   Consider GI depending on above results.

## 2022-09-30 NOTE — H&P ADULT - NSICDXPASTSURGICALHX_GEN_ALL_CORE_FT
PAST SURGICAL HISTORY:  H/O spinal fusion     History of bone graft osteomyleitits of the hup    Status post flap graft

## 2022-09-30 NOTE — ED ADULT NURSE REASSESSMENT NOTE - NS ED NURSE REASSESS COMMENT FT1
Break Coverage RN: Pt A&Ox4, no complaints at this moment, respirations are even and unlabored, VS noted, CM in progress, Safety precautions implemented as per protocol, awaiting further MD orders, will continue to monitor.

## 2022-09-30 NOTE — H&P ADULT - PROBLEM SELECTOR PLAN 1
Patient with positive u/a, fever, back pain   - UA+ for leuk esterase, nitrite   - s/p Ertapenem 1g in ED   - c/w Tylenol PRN pain and/or fever  - f/u urine culture/sensitivity  - f/u blood culture   - c/w Ertapenem with plan for 7-10 day course   - trend WBC count

## 2022-09-30 NOTE — H&P ADULT - PROBLEM SELECTOR PLAN 7
- Will continue with Metoprolol Tartrate 25 mg BID with holding parameters  - DASH diet  - c/w home meds w/ holding parameters  - monitor BP & titrate BP meds PRN.

## 2022-09-30 NOTE — H&P ADULT - NS ATTEND AMEND GEN_ALL_CORE FT
47M paraplegia, opioid dependence, GERD, neuromuscular dysfunction of bladder with chronic indwelling Medina DM2, MDD, diastolic and systolic CHF, SALLIE, HTN, ileostomy, sacral ulcer presenting [place] [facility] for [place] and back pain and flank pain admitted to medicine for the management of acute uti and back pain  --ertapenem for now, f.u urine cx  --elevated trop in the setting of combined systolic and diastolic CHF, doubt ACS, monitor   --pain control for back pain, pt off suboxone, reports doctor Willis "can't prescribe it"; ok to use opioids if necessary

## 2022-09-30 NOTE — H&P ADULT - RESPIRATORY
clear to auscultation bilaterally/no wheezes/no rales/no rhonchi/no respiratory distress/no use of accessory muscles/breath sounds equal/good air movement

## 2022-09-30 NOTE — H&P ADULT - NSHPSOCIALHISTORY_GEN_ALL_CORE
Currently residing in [facility] for the past 2 months, lives in [place], [place] ( near [place]) living alone and not currently working.

## 2022-10-01 LAB
ALBUMIN SERPL ELPH-MCNC: 3 G/DL — LOW (ref 3.3–5)
ALP SERPL-CCNC: 102 U/L — SIGNIFICANT CHANGE UP (ref 40–120)
ALT FLD-CCNC: 5 U/L — SIGNIFICANT CHANGE UP (ref 4–41)
ANION GAP SERPL CALC-SCNC: 9 MMOL/L — SIGNIFICANT CHANGE UP (ref 7–14)
AST SERPL-CCNC: 15 U/L — SIGNIFICANT CHANGE UP (ref 4–40)
BILIRUB SERPL-MCNC: 0.6 MG/DL — SIGNIFICANT CHANGE UP (ref 0.2–1.2)
BUN SERPL-MCNC: 21 MG/DL — SIGNIFICANT CHANGE UP (ref 7–23)
CALCIUM SERPL-MCNC: 8.1 MG/DL — LOW (ref 8.4–10.5)
CHLORIDE SERPL-SCNC: 98 MMOL/L — SIGNIFICANT CHANGE UP (ref 98–107)
CHOLEST SERPL-MCNC: 113 MG/DL — SIGNIFICANT CHANGE UP
CO2 SERPL-SCNC: 25 MMOL/L — SIGNIFICANT CHANGE UP (ref 22–31)
CREAT SERPL-MCNC: 0.72 MG/DL — SIGNIFICANT CHANGE UP (ref 0.5–1.3)
CULTURE RESULTS: SIGNIFICANT CHANGE UP
CULTURE RESULTS: SIGNIFICANT CHANGE UP
EGFR: 115 ML/MIN/1.73M2 — SIGNIFICANT CHANGE UP
FERRITIN SERPL-MCNC: 157 NG/ML — SIGNIFICANT CHANGE UP (ref 30–400)
FOLATE SERPL-MCNC: 7.2 NG/ML — SIGNIFICANT CHANGE UP (ref 3.1–17.5)
GLUCOSE SERPL-MCNC: 240 MG/DL — HIGH (ref 70–99)
HCT VFR BLD CALC: 29.6 % — LOW (ref 39–50)
HDLC SERPL-MCNC: 20 MG/DL — LOW
HGB BLD-MCNC: 8.5 G/DL — LOW (ref 13–17)
IRON SATN MFR SERPL: 11 % — LOW (ref 14–50)
IRON SATN MFR SERPL: 23 UG/DL — LOW (ref 45–165)
LIPID PNL WITH DIRECT LDL SERPL: 63 MG/DL — SIGNIFICANT CHANGE UP
MCHC RBC-ENTMCNC: 23.6 PG — LOW (ref 27–34)
MCHC RBC-ENTMCNC: 28.7 GM/DL — LOW (ref 32–36)
MCV RBC AUTO: 82.2 FL — SIGNIFICANT CHANGE UP (ref 80–100)
NON HDL CHOLESTEROL: 93 MG/DL — SIGNIFICANT CHANGE UP
NRBC # BLD: 0 /100 WBCS — SIGNIFICANT CHANGE UP (ref 0–0)
NRBC # FLD: 0 K/UL — SIGNIFICANT CHANGE UP (ref 0–0)
PLATELET # BLD AUTO: 153 K/UL — SIGNIFICANT CHANGE UP (ref 150–400)
POTASSIUM SERPL-MCNC: 4.2 MMOL/L — SIGNIFICANT CHANGE UP (ref 3.5–5.3)
POTASSIUM SERPL-SCNC: 4.2 MMOL/L — SIGNIFICANT CHANGE UP (ref 3.5–5.3)
PROT SERPL-MCNC: 7.2 G/DL — SIGNIFICANT CHANGE UP (ref 6–8.3)
RBC # BLD: 3.6 M/UL — LOW (ref 4.2–5.8)
RBC # FLD: 17.6 % — HIGH (ref 10.3–14.5)
SODIUM SERPL-SCNC: 132 MMOL/L — LOW (ref 135–145)
SPECIMEN SOURCE: SIGNIFICANT CHANGE UP
SPECIMEN SOURCE: SIGNIFICANT CHANGE UP
TIBC SERPL-MCNC: 206 UG/DL — LOW (ref 220–430)
TRIGL SERPL-MCNC: 152 MG/DL — HIGH
UIBC SERPL-MCNC: 183 UG/DL — SIGNIFICANT CHANGE UP (ref 110–370)
VIT B12 SERPL-MCNC: 497 PG/ML — SIGNIFICANT CHANGE UP (ref 200–900)
WBC # BLD: 3.67 K/UL — LOW (ref 3.8–10.5)
WBC # FLD AUTO: 3.67 K/UL — LOW (ref 3.8–10.5)

## 2022-10-01 PROCEDURE — 99233 SBSQ HOSP IP/OBS HIGH 50: CPT

## 2022-10-01 RX ORDER — INSULIN GLARGINE 100 [IU]/ML
50 INJECTION, SOLUTION SUBCUTANEOUS AT BEDTIME
Refills: 0 | Status: DISCONTINUED | OUTPATIENT
Start: 2022-10-01 | End: 2022-10-06

## 2022-10-01 RX ORDER — FERROUS SULFATE 325(65) MG
325 TABLET ORAL
Refills: 0 | Status: DISCONTINUED | OUTPATIENT
Start: 2022-10-01 | End: 2022-10-06

## 2022-10-01 RX ADMIN — Medication 25 MILLIGRAM(S): at 06:10

## 2022-10-01 RX ADMIN — Medication 2: at 09:09

## 2022-10-01 RX ADMIN — TRAMADOL HYDROCHLORIDE 50 MILLIGRAM(S): 50 TABLET ORAL at 06:09

## 2022-10-01 RX ADMIN — Medication 1000 UNIT(S): at 11:57

## 2022-10-01 RX ADMIN — Medication 25 MILLIGRAM(S): at 18:08

## 2022-10-01 RX ADMIN — Medication 650 MILLIGRAM(S): at 11:57

## 2022-10-01 RX ADMIN — TRAMADOL HYDROCHLORIDE 50 MILLIGRAM(S): 50 TABLET ORAL at 15:56

## 2022-10-01 RX ADMIN — Medication 80 MILLIGRAM(S): at 06:10

## 2022-10-01 RX ADMIN — GABAPENTIN 400 MILLIGRAM(S): 400 CAPSULE ORAL at 06:10

## 2022-10-01 RX ADMIN — Medication 1: at 18:10

## 2022-10-01 RX ADMIN — INSULIN GLARGINE 50 UNIT(S): 100 INJECTION, SOLUTION SUBCUTANEOUS at 21:47

## 2022-10-01 RX ADMIN — Medication 325 MILLIGRAM(S): at 21:19

## 2022-10-01 RX ADMIN — DIVALPROEX SODIUM 500 MILLIGRAM(S): 500 TABLET, DELAYED RELEASE ORAL at 06:09

## 2022-10-01 RX ADMIN — DULOXETINE HYDROCHLORIDE 60 MILLIGRAM(S): 30 CAPSULE, DELAYED RELEASE ORAL at 12:00

## 2022-10-01 RX ADMIN — ENOXAPARIN SODIUM 40 MILLIGRAM(S): 100 INJECTION SUBCUTANEOUS at 16:03

## 2022-10-01 RX ADMIN — Medication 6 MILLIGRAM(S): at 21:17

## 2022-10-01 RX ADMIN — ERTAPENEM SODIUM 120 MILLIGRAM(S): 1 INJECTION, POWDER, LYOPHILIZED, FOR SOLUTION INTRAMUSCULAR; INTRAVENOUS at 06:11

## 2022-10-01 RX ADMIN — Medication 2: at 12:24

## 2022-10-01 RX ADMIN — Medication 5 MILLIGRAM(S): at 22:04

## 2022-10-01 RX ADMIN — Medication 5 MILLIGRAM(S): at 15:57

## 2022-10-01 RX ADMIN — OXYCODONE HYDROCHLORIDE 30 MILLIGRAM(S): 5 TABLET ORAL at 21:15

## 2022-10-01 RX ADMIN — Medication 5 MILLIGRAM(S): at 06:47

## 2022-10-01 RX ADMIN — OXYCODONE HYDROCHLORIDE 30 MILLIGRAM(S): 5 TABLET ORAL at 12:44

## 2022-10-01 RX ADMIN — GABAPENTIN 400 MILLIGRAM(S): 400 CAPSULE ORAL at 15:56

## 2022-10-01 RX ADMIN — GABAPENTIN 400 MILLIGRAM(S): 400 CAPSULE ORAL at 21:16

## 2022-10-01 RX ADMIN — DIVALPROEX SODIUM 500 MILLIGRAM(S): 500 TABLET, DELAYED RELEASE ORAL at 18:08

## 2022-10-01 NOTE — PROGRESS NOTE ADULT - PROBLEM SELECTOR PLAN 1
Patient with positive u/a, fever, back pain   - UA+ for leuk esterase, nitrite   - c/w Tylenol PRN pain and/or fever  - f/u urine culture/sensitivity  - blood culture ngtd  - c/w Ertapenem with plan for 7-10 day course   - trend WBC count

## 2022-10-02 LAB
ALBUMIN SERPL ELPH-MCNC: 3 G/DL — LOW (ref 3.3–5)
ALP SERPL-CCNC: 97 U/L — SIGNIFICANT CHANGE UP (ref 40–120)
ALT FLD-CCNC: 6 U/L — SIGNIFICANT CHANGE UP (ref 4–41)
ANION GAP SERPL CALC-SCNC: 9 MMOL/L — SIGNIFICANT CHANGE UP (ref 7–14)
AST SERPL-CCNC: 13 U/L — SIGNIFICANT CHANGE UP (ref 4–40)
BILIRUB SERPL-MCNC: 0.4 MG/DL — SIGNIFICANT CHANGE UP (ref 0.2–1.2)
BUN SERPL-MCNC: 16 MG/DL — SIGNIFICANT CHANGE UP (ref 7–23)
CALCIUM SERPL-MCNC: 8 MG/DL — LOW (ref 8.4–10.5)
CHLORIDE SERPL-SCNC: 100 MMOL/L — SIGNIFICANT CHANGE UP (ref 98–107)
CO2 SERPL-SCNC: 25 MMOL/L — SIGNIFICANT CHANGE UP (ref 22–31)
CREAT SERPL-MCNC: 0.6 MG/DL — SIGNIFICANT CHANGE UP (ref 0.5–1.3)
EGFR: 121 ML/MIN/1.73M2 — SIGNIFICANT CHANGE UP
GLUCOSE SERPL-MCNC: 173 MG/DL — HIGH (ref 70–99)
HCT VFR BLD CALC: 28.2 % — LOW (ref 39–50)
HGB BLD-MCNC: 8.1 G/DL — LOW (ref 13–17)
MAGNESIUM SERPL-MCNC: 2.1 MG/DL — SIGNIFICANT CHANGE UP (ref 1.6–2.6)
MCHC RBC-ENTMCNC: 23.2 PG — LOW (ref 27–34)
MCHC RBC-ENTMCNC: 28.7 GM/DL — LOW (ref 32–36)
MCV RBC AUTO: 80.8 FL — SIGNIFICANT CHANGE UP (ref 80–100)
NRBC # BLD: 0 /100 WBCS — SIGNIFICANT CHANGE UP (ref 0–0)
NRBC # FLD: 0 K/UL — SIGNIFICANT CHANGE UP (ref 0–0)
PHOSPHATE SERPL-MCNC: 3.9 MG/DL — SIGNIFICANT CHANGE UP (ref 2.5–4.5)
PLATELET # BLD AUTO: 159 K/UL — SIGNIFICANT CHANGE UP (ref 150–400)
POTASSIUM SERPL-MCNC: 4.3 MMOL/L — SIGNIFICANT CHANGE UP (ref 3.5–5.3)
POTASSIUM SERPL-SCNC: 4.3 MMOL/L — SIGNIFICANT CHANGE UP (ref 3.5–5.3)
PROT SERPL-MCNC: 7 G/DL — SIGNIFICANT CHANGE UP (ref 6–8.3)
RBC # BLD: 3.49 M/UL — LOW (ref 4.2–5.8)
RBC # FLD: 17.5 % — HIGH (ref 10.3–14.5)
SODIUM SERPL-SCNC: 134 MMOL/L — LOW (ref 135–145)
WBC # BLD: 4.59 K/UL — SIGNIFICANT CHANGE UP (ref 3.8–10.5)
WBC # FLD AUTO: 4.59 K/UL — SIGNIFICANT CHANGE UP (ref 3.8–10.5)

## 2022-10-02 PROCEDURE — 93306 TTE W/DOPPLER COMPLETE: CPT | Mod: 26

## 2022-10-02 PROCEDURE — 99233 SBSQ HOSP IP/OBS HIGH 50: CPT

## 2022-10-02 RX ORDER — INFLUENZA VIRUS VACCINE 15; 15; 15; 15 UG/.5ML; UG/.5ML; UG/.5ML; UG/.5ML
0.5 SUSPENSION INTRAMUSCULAR ONCE
Refills: 0 | Status: DISCONTINUED | OUTPATIENT
Start: 2022-10-02 | End: 2022-10-06

## 2022-10-02 RX ADMIN — GABAPENTIN 400 MILLIGRAM(S): 400 CAPSULE ORAL at 05:37

## 2022-10-02 RX ADMIN — INSULIN GLARGINE 50 UNIT(S): 100 INJECTION, SOLUTION SUBCUTANEOUS at 21:47

## 2022-10-02 RX ADMIN — Medication 1: at 12:44

## 2022-10-02 RX ADMIN — TRAMADOL HYDROCHLORIDE 50 MILLIGRAM(S): 50 TABLET ORAL at 14:47

## 2022-10-02 RX ADMIN — Medication 650 MILLIGRAM(S): at 12:57

## 2022-10-02 RX ADMIN — Medication 5 MILLIGRAM(S): at 05:36

## 2022-10-02 RX ADMIN — GABAPENTIN 400 MILLIGRAM(S): 400 CAPSULE ORAL at 14:48

## 2022-10-02 RX ADMIN — Medication 25 MILLIGRAM(S): at 05:37

## 2022-10-02 RX ADMIN — Medication 2: at 08:46

## 2022-10-02 RX ADMIN — Medication 80 MILLIGRAM(S): at 05:35

## 2022-10-02 RX ADMIN — ENOXAPARIN SODIUM 40 MILLIGRAM(S): 100 INJECTION SUBCUTANEOUS at 14:47

## 2022-10-02 RX ADMIN — TRAMADOL HYDROCHLORIDE 50 MILLIGRAM(S): 50 TABLET ORAL at 15:47

## 2022-10-02 RX ADMIN — DULOXETINE HYDROCHLORIDE 60 MILLIGRAM(S): 30 CAPSULE, DELAYED RELEASE ORAL at 09:20

## 2022-10-02 RX ADMIN — Medication 25 MILLIGRAM(S): at 18:31

## 2022-10-02 RX ADMIN — Medication 1000 UNIT(S): at 09:20

## 2022-10-02 RX ADMIN — Medication 1: at 17:59

## 2022-10-02 RX ADMIN — Medication 5 MILLIGRAM(S): at 18:33

## 2022-10-02 RX ADMIN — DIVALPROEX SODIUM 500 MILLIGRAM(S): 500 TABLET, DELAYED RELEASE ORAL at 05:37

## 2022-10-02 RX ADMIN — CYCLOBENZAPRINE HYDROCHLORIDE 10 MILLIGRAM(S): 10 TABLET, FILM COATED ORAL at 18:30

## 2022-10-02 RX ADMIN — TRAMADOL HYDROCHLORIDE 50 MILLIGRAM(S): 50 TABLET ORAL at 05:35

## 2022-10-02 RX ADMIN — GABAPENTIN 400 MILLIGRAM(S): 400 CAPSULE ORAL at 21:47

## 2022-10-02 RX ADMIN — SENNA PLUS 2 TABLET(S): 8.6 TABLET ORAL at 21:47

## 2022-10-02 RX ADMIN — ERTAPENEM SODIUM 120 MILLIGRAM(S): 1 INJECTION, POWDER, LYOPHILIZED, FOR SOLUTION INTRAMUSCULAR; INTRAVENOUS at 05:36

## 2022-10-02 RX ADMIN — OXYCODONE HYDROCHLORIDE 30 MILLIGRAM(S): 5 TABLET ORAL at 13:43

## 2022-10-02 RX ADMIN — OXYCODONE HYDROCHLORIDE 30 MILLIGRAM(S): 5 TABLET ORAL at 22:51

## 2022-10-02 RX ADMIN — Medication 6 MILLIGRAM(S): at 21:47

## 2022-10-02 RX ADMIN — DIVALPROEX SODIUM 500 MILLIGRAM(S): 500 TABLET, DELAYED RELEASE ORAL at 18:31

## 2022-10-02 RX ADMIN — TRAMADOL HYDROCHLORIDE 50 MILLIGRAM(S): 50 TABLET ORAL at 21:46

## 2022-10-02 RX ADMIN — OXYCODONE HYDROCHLORIDE 30 MILLIGRAM(S): 5 TABLET ORAL at 12:44

## 2022-10-02 NOTE — PATIENT PROFILE ADULT - HAS THE PATIENT RECEIVED THE INFLUENZA VACCINE THIS SEASON?
"Chief Complaint   Patient presents with     Musculoskeletal Problem       Initial /78   Pulse 98   Ht 1.778 m (5' 10\")   Wt 78 kg (172 lb)   SpO2 98%   BMI 24.68 kg/m   Estimated body mass index is 24.68 kg/m  as calculated from the following:    Height as of this encounter: 1.778 m (5' 10\").    Weight as of this encounter: 78 kg (172 lb).  Medication Reconciliation: complete  Ning Murphy LPN  "
no...

## 2022-10-02 NOTE — PATIENT PROFILE ADULT - FALL HARM RISK - HARM RISK INTERVENTIONS

## 2022-10-02 NOTE — PROGRESS NOTE ADULT - PROBLEM SELECTOR PLAN 1
Patient with positive u/a, fever, back pain   - UA+ for leuk esterase, nitrite   - Ucx 3+ organisms   - Clinically improving, follow up repeat Ucx   - Consider obtaining  prior Ucx to determine hx of resistance   - c/w Tylenol PRN pain and/or fever  - blood culture ngtd  - c/w Ertapenem with plan for 7-10 day course for now   - trend WBC count

## 2022-10-03 DIAGNOSIS — K59.00 CONSTIPATION, UNSPECIFIED: ICD-10-CM

## 2022-10-03 LAB
ALBUMIN SERPL ELPH-MCNC: 2.9 G/DL — LOW (ref 3.3–5)
ALP SERPL-CCNC: 104 U/L — SIGNIFICANT CHANGE UP (ref 40–120)
ALT FLD-CCNC: 8 U/L — SIGNIFICANT CHANGE UP (ref 4–41)
ANION GAP SERPL CALC-SCNC: 7 MMOL/L — SIGNIFICANT CHANGE UP (ref 7–14)
AST SERPL-CCNC: 15 U/L — SIGNIFICANT CHANGE UP (ref 4–40)
BILIRUB SERPL-MCNC: 0.4 MG/DL — SIGNIFICANT CHANGE UP (ref 0.2–1.2)
BUN SERPL-MCNC: 13 MG/DL — SIGNIFICANT CHANGE UP (ref 7–23)
CALCIUM SERPL-MCNC: 8.3 MG/DL — LOW (ref 8.4–10.5)
CHLORIDE SERPL-SCNC: 99 MMOL/L — SIGNIFICANT CHANGE UP (ref 98–107)
CO2 SERPL-SCNC: 27 MMOL/L — SIGNIFICANT CHANGE UP (ref 22–31)
CREAT SERPL-MCNC: 0.76 MG/DL — SIGNIFICANT CHANGE UP (ref 0.5–1.3)
EGFR: 113 ML/MIN/1.73M2 — SIGNIFICANT CHANGE UP
GLUCOSE SERPL-MCNC: 154 MG/DL — HIGH (ref 70–99)
HCT VFR BLD CALC: 28.7 % — LOW (ref 39–50)
HGB BLD-MCNC: 8.2 G/DL — LOW (ref 13–17)
MAGNESIUM SERPL-MCNC: 2 MG/DL — SIGNIFICANT CHANGE UP (ref 1.6–2.6)
MCHC RBC-ENTMCNC: 23.1 PG — LOW (ref 27–34)
MCHC RBC-ENTMCNC: 28.6 GM/DL — LOW (ref 32–36)
MCV RBC AUTO: 80.8 FL — SIGNIFICANT CHANGE UP (ref 80–100)
NRBC # BLD: 0 /100 WBCS — SIGNIFICANT CHANGE UP (ref 0–0)
NRBC # FLD: 0 K/UL — SIGNIFICANT CHANGE UP (ref 0–0)
PHOSPHATE SERPL-MCNC: 3.6 MG/DL — SIGNIFICANT CHANGE UP (ref 2.5–4.5)
PLATELET # BLD AUTO: 163 K/UL — SIGNIFICANT CHANGE UP (ref 150–400)
POTASSIUM SERPL-MCNC: 4.9 MMOL/L — SIGNIFICANT CHANGE UP (ref 3.5–5.3)
POTASSIUM SERPL-SCNC: 4.9 MMOL/L — SIGNIFICANT CHANGE UP (ref 3.5–5.3)
PROT SERPL-MCNC: 7 G/DL — SIGNIFICANT CHANGE UP (ref 6–8.3)
RBC # BLD: 3.55 M/UL — LOW (ref 4.2–5.8)
RBC # FLD: 17.6 % — HIGH (ref 10.3–14.5)
SODIUM SERPL-SCNC: 133 MMOL/L — LOW (ref 135–145)
WBC # BLD: 4.14 K/UL — SIGNIFICANT CHANGE UP (ref 3.8–10.5)
WBC # FLD AUTO: 4.14 K/UL — SIGNIFICANT CHANGE UP (ref 3.8–10.5)

## 2022-10-03 PROCEDURE — 99223 1ST HOSP IP/OBS HIGH 75: CPT

## 2022-10-03 PROCEDURE — 93970 EXTREMITY STUDY: CPT | Mod: 26

## 2022-10-03 PROCEDURE — 99233 SBSQ HOSP IP/OBS HIGH 50: CPT

## 2022-10-03 RX ORDER — SENNA PLUS 8.6 MG/1
2 TABLET ORAL
Refills: 0 | Status: DISCONTINUED | OUTPATIENT
Start: 2022-10-03 | End: 2022-10-06

## 2022-10-03 RX ORDER — BACLOFEN 100 %
20 POWDER (GRAM) MISCELLANEOUS EVERY 6 HOURS
Refills: 0 | Status: DISCONTINUED | OUTPATIENT
Start: 2022-10-03 | End: 2022-10-06

## 2022-10-03 RX ORDER — POLYETHYLENE GLYCOL 3350 17 G/17G
17 POWDER, FOR SOLUTION ORAL DAILY
Refills: 0 | Status: DISCONTINUED | OUTPATIENT
Start: 2022-10-03 | End: 2022-10-06

## 2022-10-03 RX ADMIN — ENOXAPARIN SODIUM 40 MILLIGRAM(S): 100 INJECTION SUBCUTANEOUS at 15:42

## 2022-10-03 RX ADMIN — DULOXETINE HYDROCHLORIDE 60 MILLIGRAM(S): 30 CAPSULE, DELAYED RELEASE ORAL at 12:59

## 2022-10-03 RX ADMIN — Medication 20 MILLIGRAM(S): at 15:42

## 2022-10-03 RX ADMIN — DIVALPROEX SODIUM 500 MILLIGRAM(S): 500 TABLET, DELAYED RELEASE ORAL at 05:20

## 2022-10-03 RX ADMIN — Medication 1: at 12:37

## 2022-10-03 RX ADMIN — Medication 20 MILLIGRAM(S): at 22:28

## 2022-10-03 RX ADMIN — TRAMADOL HYDROCHLORIDE 50 MILLIGRAM(S): 50 TABLET ORAL at 23:06

## 2022-10-03 RX ADMIN — DIVALPROEX SODIUM 500 MILLIGRAM(S): 500 TABLET, DELAYED RELEASE ORAL at 18:40

## 2022-10-03 RX ADMIN — GABAPENTIN 400 MILLIGRAM(S): 400 CAPSULE ORAL at 05:20

## 2022-10-03 RX ADMIN — TRAMADOL HYDROCHLORIDE 50 MILLIGRAM(S): 50 TABLET ORAL at 05:23

## 2022-10-03 RX ADMIN — CYCLOBENZAPRINE HYDROCHLORIDE 10 MILLIGRAM(S): 10 TABLET, FILM COATED ORAL at 01:35

## 2022-10-03 RX ADMIN — OXYCODONE HYDROCHLORIDE 30 MILLIGRAM(S): 5 TABLET ORAL at 09:57

## 2022-10-03 RX ADMIN — CYCLOBENZAPRINE HYDROCHLORIDE 10 MILLIGRAM(S): 10 TABLET, FILM COATED ORAL at 22:28

## 2022-10-03 RX ADMIN — TRAMADOL HYDROCHLORIDE 50 MILLIGRAM(S): 50 TABLET ORAL at 15:42

## 2022-10-03 RX ADMIN — TRAMADOL HYDROCHLORIDE 50 MILLIGRAM(S): 50 TABLET ORAL at 22:19

## 2022-10-03 RX ADMIN — INSULIN GLARGINE 50 UNIT(S): 100 INJECTION, SOLUTION SUBCUTANEOUS at 22:28

## 2022-10-03 RX ADMIN — Medication 25 MILLIGRAM(S): at 05:21

## 2022-10-03 RX ADMIN — Medication 25 MILLIGRAM(S): at 18:40

## 2022-10-03 RX ADMIN — ERTAPENEM SODIUM 120 MILLIGRAM(S): 1 INJECTION, POWDER, LYOPHILIZED, FOR SOLUTION INTRAMUSCULAR; INTRAVENOUS at 05:20

## 2022-10-03 RX ADMIN — GABAPENTIN 400 MILLIGRAM(S): 400 CAPSULE ORAL at 22:20

## 2022-10-03 RX ADMIN — SENNA PLUS 2 TABLET(S): 8.6 TABLET ORAL at 18:40

## 2022-10-03 RX ADMIN — OXYCODONE HYDROCHLORIDE 30 MILLIGRAM(S): 5 TABLET ORAL at 18:40

## 2022-10-03 RX ADMIN — Medication 650 MILLIGRAM(S): at 23:06

## 2022-10-03 RX ADMIN — Medication 1000 UNIT(S): at 12:58

## 2022-10-03 RX ADMIN — Medication 325 MILLIGRAM(S): at 08:45

## 2022-10-03 RX ADMIN — Medication 6 MILLIGRAM(S): at 22:19

## 2022-10-03 RX ADMIN — GABAPENTIN 400 MILLIGRAM(S): 400 CAPSULE ORAL at 15:42

## 2022-10-03 RX ADMIN — Medication 80 MILLIGRAM(S): at 05:20

## 2022-10-03 RX ADMIN — Medication 5 MILLIGRAM(S): at 05:21

## 2022-10-03 RX ADMIN — Medication 650 MILLIGRAM(S): at 22:19

## 2022-10-03 NOTE — CONSULT NOTE ADULT - SUBJECTIVE AND OBJECTIVE BOX
Wound SURGERY CONSULT NOTE    HPI:  48 y/o Male, with a PmHx paraplegia, opioid dependence and withdrawal, GERD, neuromuscular dysfunction of bladder w/ chronic indwelling Medina DM2, Major Depression Disorder, diastolic and systolic CHF, SALLIE on CPAP, HTN, colostomy, sacral ulcer, [facility] for [facility] pain, flank pain and fever. He states that he has been dealing with chronic back pain for many years that was manageable until he checked in the rehab center 2 months ago. He attributes the recurrence of pain to being on a hard bed and turning and position minimally helps. Describing the pain as piercing; as if someone is digging a knife into his back, radiating from the lower back going up to the middle. He rates the pain 8/10 and feels that it is consistent throughout the day. A week ago he started to notice flank pain more so on the left side, described as a localized kicking pain also rated an 8/10 for most of the day. Patient states shortly after the catheter change he saw pus in the drain. Three to four days later he reports an onset of fever, in rehab with T-Max  of 100.4. He was given Tylenol in the morning and afternoon daily since the onset with minimal relief. He otherwise denies new weight loss/gain, headaches, loc, dizziness, numbness/tingling, chills, SOB, CP, palpitations n/v/, d/c. (30 Sep 2022 09:26)    Wound consult requested to assist w/ management of chronic sacral stage 4 pressure injury. Per patient he has been struggling on and off with his sacral stage 4 pressure injury for many years. Has been treated with NPWT/VAC therapy in the past but has been told that the wound is too shallow to be treated with VAC therapy at this time. Additionally reports that calcium alginates and silver dressings were successful in the past. Reports that he was previously treated for osteomyelitis of his left hip, but was never informed of a sacral bone infection. Patient denies pain and/or tenderness to area. He reports that his back pain he was admitted with is improving. Denies c/v, fever, chills. Reports he has an Ileostomy that he manages himself. Reports SOB at times when lying flat, denies CP.     Current Diet: Diet, Regular:   Consistent Carbohydrate No Snacks (CSTCHO)  DASH/TLC Sodium & Cholesterol Restricted (DASH) (09-30-22 @ 10:42)      PAST MEDICAL & SURGICAL HISTORY:  HTN (hypertension)  Paraplegic spinal paralysis  DM (diabetes mellitus)  GERD (gastroesophageal reflux disease)  Opioid dependence with withdrawal  Neuromuscular dysfunction of bladder  MDD (major depressive disorder)  Chronic combined systolic and diastolic heart failure  SALLIE (obstructive sleep apnea)  Colostomy in place  Sacral ulcer  H/O spinal fusion  Status post flap graft  History of bone graft  osteomyelitis of the left hip      REVIEW OF SYSTEMS: General, skin, MSK, , GI: see HPI  All other systems negative    MEDICATIONS  (STANDING):  cholecalciferol 1000 Unit(s) Oral daily  dextrose 5%. 1000 milliLiter(s) (50 mL/Hr) IV Continuous <Continuous>  dextrose 5%. 1000 milliLiter(s) (100 mL/Hr) IV Continuous <Continuous>  dextrose 50% Injectable 25 Gram(s) IV Push once  dextrose 50% Injectable 12.5 Gram(s) IV Push once  dextrose 50% Injectable 25 Gram(s) IV Push once  diVALproex  milliGRAM(s) Oral every 12 hours  DULoxetine 60 milliGRAM(s) Oral daily  enoxaparin Injectable 40 milliGRAM(s) SubCutaneous every 24 hours  ertapenem  IVPB 1000 milliGRAM(s) IV Intermittent every 24 hours  ferrous    sulfate 325 milliGRAM(s) Oral <User Schedule>  furosemide    Tablet 80 milliGRAM(s) Oral daily  gabapentin 400 milliGRAM(s) Oral every 8 hours  glucagon  Injectable 1 milliGRAM(s) IntraMuscular once  influenza   Vaccine 0.5 milliLiter(s) IntraMuscular once  insulin glargine Injectable (LANTUS) 50 Unit(s) SubCutaneous at bedtime  insulin lispro (ADMELOG) corrective regimen sliding scale   SubCutaneous three times a day before meals  insulin lispro (ADMELOG) corrective regimen sliding scale   SubCutaneous at bedtime  melatonin 6 milliGRAM(s) Oral at bedtime  metoprolol tartrate 25 milliGRAM(s) Oral two times a day  senna 2 Tablet(s) Oral two times a day  traMADol 50 milliGRAM(s) Oral every 8 hours    MEDICATIONS  (PRN):  acetaminophen     Tablet .. 650 milliGRAM(s) Oral every 6 hours PRN Temp greater or equal to 38C (100.4F), Mild Pain (1 - 3)  aluminum hydroxide/magnesium hydroxide/simethicone Suspension 30 milliLiter(s) Oral every 4 hours PRN Dyspepsia  baclofen 20 milliGRAM(s) Oral every 6 hours PRN Muscle Spasm  cyclobenzaprine 10 milliGRAM(s) Oral two times a day PRN Muscle Spasm  dextrose Oral Gel 15 Gram(s) Oral once PRN Blood Glucose LESS THAN 70 milliGRAM(s)/deciliter  oxyCODONE    IR 30 milliGRAM(s) Oral every 6 hours PRN Moderate Pain (4 - 6)  polyethylene glycol 3350 17 Gram(s) Oral daily PRN Constipation      Allergies    sulfa drugs (Anaphylaxis)    Intolerances        SOCIAL HISTORY:  Paraplegic, bedbound. From [facility]. Previously resided near [place].  Per records history for opioid dependence. No smoking.     FAMILY HISTORY:  Family history of hypertension in father (Father)    Family history of stroke or transient ischemic attack in father (Father)    Family history of MI (myocardial infarction) (Father)    Family history of cancer in mother (Mother)        PHYSICAL EXAM:  Vital Signs Last 24 Hrs  T(C): 36.7 (03 Oct 2022 09:50), Max: 37.6 (03 Oct 2022 03:51)  T(F): 98.1 (03 Oct 2022 09:50), Max: 99.6 (03 Oct 2022 03:51)  HR: 61 (03 Oct 2022 11:08) (61 - 73)  BP: 112/58 (03 Oct 2022 09:50) (112/58 - 126/56)  BP(mean): --  RR: 16 (03 Oct 2022 11:08) (16 - 17)  SpO2: 97% (03 Oct 2022 11:08) (96% - 100%)    Parameters below as of 03 Oct 2022 11:08  Patient On (Oxygen Delivery Method): nasal cannula  O2 Flow (L/min): 2    Wt: 154.2 kg (9-30-22)    Constitutional: NAD, A&Ox4  Obese, well groomed.  (+) low airloss support surface, (+) fluidized positioning devices  HEENT:  NC/AT, nonicteric, mucosa moist  Cardiovascular: RRR   Respiratory: supplemental oxygen via NC, nonlabored, equal chest expansion  Gastrointestinal: soft NT/ND, RUQ ostomy with pasty fecal effluent, Ostomy pouch intact.  Neurology:  + paraplegic  Musculoskeletal:  limited, bed bound, muscle atrophy b/l le, in "butterfly" like position, feet everted.  Vascular: BLE equally warm, +2 dp pulses, capillary refill < 3 seconds. Dry-flaky skin,  + blanchable erythema to bilateral heels and lateral feet.  Skin: moist w/ good turgor  Sacrum to bilateral buttocks Chronic Stage 4 pressure injury  Posterior scrotum- chronic full thickness pressure injury-    LABS/ CULTURES/ RADIOLOGY:                        8.2    4.14  )-----------( 163      ( 03 Oct 2022 07:03 )             28.7       133  |  99  |  13  ----------------------------<  154      [10-03-22 @ 07:03]  4.9   |  27  |  0.76        Ca     8.3     [10-03-22 @ 07:03]      Mg     2.00     [10-03-22 @ 07:03]      Phos  3.6     [10-03-22 @ 07:03]    TPro  7.0  /  Alb  2.9  /  TBili  0.4  /  DBili  x   /  AST  15  /  ALT  8   /  AlkPhos  104  [10-03-22 @ 07:03]              Culture - Blood (collected 09-30-22 @ 03:00)  Source: .Blood Blood-Peripheral  Preliminary Report (10-01-22 @ 07:01):    No growth to date.    Culture - Blood (collected 09-30-22 @ 02:45)  Source: .Blood Blood-Peripheral  Preliminary Report (10-01-22 @ 07:01):    No growth to date.           Wound SURGERY CONSULT NOTE    HPI:  46 y/o Male, with a PmHx paraplegia, opioid dependence and withdrawal, GERD, neuromuscular dysfunction of bladder w/ chronic indwelling Medina DM2, Major Depression Disorder, diastolic and systolic CHF, SALLIE on CPAP, HTN, colostomy, sacral ulcer, [facility] for [facility] pain, flank pain and fever. He states that he has been dealing with chronic back pain for many years that was manageable until he checked in the rehab center 2 months ago. He attributes the recurrence of pain to being on a hard bed and turning and position minimally helps. Describing the pain as piercing; as if someone is digging a knife into his back, radiating from the lower back going up to the middle. He rates the pain 8/10 and feels that it is consistent throughout the day. A week ago he started to notice flank pain more so on the left side, described as a localized kicking pain also rated an 8/10 for most of the day. Patient states shortly after the catheter change he saw pus in the drain. Three to four days later he reports an onset of fever, in rehab with T-Max  of 100.4. He was given Tylenol in the morning and afternoon daily since the onset with minimal relief. He otherwise denies new weight loss/gain, headaches, loc, dizziness, numbness/tingling, chills, SOB, CP, palpitations n/v/, d/c. (30 Sep 2022 09:26)    Wound consult requested to assist w/ management of chronic sacral stage 4 pressure injury. Per patient he has been struggling on and off with his sacral stage 4 pressure injury for many years. Has been treated with NPWT/VAC therapy in the past but has been told that the wound is too shallow to be treated with VAC therapy at this time. Additionally reports that calcium alginates and silver dressings were successful in the past. Reports that he was previously treated for osteomyelitis of his left hip, but was never informed of a sacral bone infection. Patient denies pain and/or tenderness to area. He reports that his back pain he was admitted with is improving. Denies c/v, fever, chills. Reports he has an Ileostomy that he manages himself. Reports SOB at times when lying flat, denies CP.     Current Diet: Diet, Regular:   Consistent Carbohydrate No Snacks (CSTCHO)  DASH/TLC Sodium & Cholesterol Restricted (DASH) (09-30-22 @ 10:42)      PAST MEDICAL & SURGICAL HISTORY:  HTN (hypertension)  Paraplegic spinal paralysis  DM (diabetes mellitus)  GERD (gastroesophageal reflux disease)  Opioid dependence with withdrawal  Neuromuscular dysfunction of bladder  MDD (major depressive disorder)  Chronic combined systolic and diastolic heart failure  SALLIE (obstructive sleep apnea)  Colostomy in place  Sacral ulcer  H/O spinal fusion  Status post flap graft  History of bone graft  osteomyelitis of the left hip      REVIEW OF SYSTEMS: General, skin, MSK, , GI: see HPI  All other systems negative    MEDICATIONS  (STANDING):  cholecalciferol 1000 Unit(s) Oral daily  dextrose 5%. 1000 milliLiter(s) (50 mL/Hr) IV Continuous <Continuous>  dextrose 5%. 1000 milliLiter(s) (100 mL/Hr) IV Continuous <Continuous>  dextrose 50% Injectable 25 Gram(s) IV Push once  dextrose 50% Injectable 12.5 Gram(s) IV Push once  dextrose 50% Injectable 25 Gram(s) IV Push once  diVALproex  milliGRAM(s) Oral every 12 hours  DULoxetine 60 milliGRAM(s) Oral daily  enoxaparin Injectable 40 milliGRAM(s) SubCutaneous every 24 hours  ertapenem  IVPB 1000 milliGRAM(s) IV Intermittent every 24 hours  ferrous    sulfate 325 milliGRAM(s) Oral <User Schedule>  furosemide    Tablet 80 milliGRAM(s) Oral daily  gabapentin 400 milliGRAM(s) Oral every 8 hours  glucagon  Injectable 1 milliGRAM(s) IntraMuscular once  influenza   Vaccine 0.5 milliLiter(s) IntraMuscular once  insulin glargine Injectable (LANTUS) 50 Unit(s) SubCutaneous at bedtime  insulin lispro (ADMELOG) corrective regimen sliding scale   SubCutaneous three times a day before meals  insulin lispro (ADMELOG) corrective regimen sliding scale   SubCutaneous at bedtime  melatonin 6 milliGRAM(s) Oral at bedtime  metoprolol tartrate 25 milliGRAM(s) Oral two times a day  senna 2 Tablet(s) Oral two times a day  traMADol 50 milliGRAM(s) Oral every 8 hours    MEDICATIONS  (PRN):  acetaminophen     Tablet .. 650 milliGRAM(s) Oral every 6 hours PRN Temp greater or equal to 38C (100.4F), Mild Pain (1 - 3)  aluminum hydroxide/magnesium hydroxide/simethicone Suspension 30 milliLiter(s) Oral every 4 hours PRN Dyspepsia  baclofen 20 milliGRAM(s) Oral every 6 hours PRN Muscle Spasm  cyclobenzaprine 10 milliGRAM(s) Oral two times a day PRN Muscle Spasm  dextrose Oral Gel 15 Gram(s) Oral once PRN Blood Glucose LESS THAN 70 milliGRAM(s)/deciliter  oxyCODONE    IR 30 milliGRAM(s) Oral every 6 hours PRN Moderate Pain (4 - 6)  polyethylene glycol 3350 17 Gram(s) Oral daily PRN Constipation      Allergies    sulfa drugs (Anaphylaxis)    Intolerances        SOCIAL HISTORY:  Paraplegic, bedbound. From [facility]. Previously resided near [place].  Per records history for opioid dependence. No smoking.     FAMILY HISTORY:  Family history of hypertension in father (Father)    Family history of stroke or transient ischemic attack in father (Father)    Family history of MI (myocardial infarction) (Father)    Family history of cancer in mother (Mother)        PHYSICAL EXAM:  Vital Signs Last 24 Hrs  T(C): 36.7 (03 Oct 2022 09:50), Max: 37.6 (03 Oct 2022 03:51)  T(F): 98.1 (03 Oct 2022 09:50), Max: 99.6 (03 Oct 2022 03:51)  HR: 61 (03 Oct 2022 11:08) (61 - 73)  BP: 112/58 (03 Oct 2022 09:50) (112/58 - 126/56)  BP(mean): --  RR: 16 (03 Oct 2022 11:08) (16 - 17)  SpO2: 97% (03 Oct 2022 11:08) (96% - 100%)    Parameters below as of 03 Oct 2022 11:08  Patient On (Oxygen Delivery Method): nasal cannula  O2 Flow (L/min): 2    Wt: 154.2 kg (9-30-22)    Constitutional: NAD, A&Ox4  Obese, well groomed.  (+) low airloss support surface, (+) fluidized positioning devices  HEENT:  NC/AT, nonicteric, mucosa moist  Cardiovascular: RRR   Respiratory: supplemental oxygen via NC, nonlabored, equal chest expansion  Gastrointestinal: soft NT/ND, RUQ ostomy with pasty fecal effluent, Ostomy pouch intact.  Neurology:  + paraplegic  Musculoskeletal:  limited, bed bound, muscle atrophy b/l le, in "butterfly" like position, feet everted.  Vascular: BLE equally warm, +2 dp pulses, capillary refill < 3 seconds. Dry-flaky skin,  + blanchable erythema to bilateral heels and lateral feet.  Skin: moist w/ good turgor  Sacrum to bilateral buttocks Chronic Stage 4 pressure injury-  [id number], entire area measured in cluster. Large area of chronic sacral stage 4 pressure injury with soft tissue contraction with scattered areas of eroded r-epithelialization exposing mixed pink and deep red a granular base with irregular borders. Small-moderate serosanguinous drainage, no odor. Periwound border with mild maceration. No increased warmth, no edema, no erythema, no induration, no crepitus, no fluctuance noted.   Posterior scrotum- chronic full thickness pressure injury- [id number], irregular borders. 100% deep red agranular base. Periwound without increased warmth, no edema, no erythema, no induration, no crepitus, no fluctuance noted.       LABS/ CULTURES/ RADIOLOGY:                        8.2    4.14  )-----------( 163      ( 03 Oct 2022 07:03 )             28.7       133  |  99  |  13  ----------------------------<  154      [10-03-22 @ 07:03]  4.9   |  27  |  0.76        Ca     8.3     [10-03-22 @ 07:03]      Mg     2.00     [10-03-22 @ 07:03]      Phos  3.6     [10-03-22 @ 07:03]    TPro  7.0  /  Alb  2.9  /  TBili  0.4  /  DBili  x   /  AST  15  /  ALT  8   /  AlkPhos  104  [10-03-22 @ 07:03]        Culture - Blood (collected 09-30-22 @ 03:00)  Source: .Blood Blood-Peripheral  Preliminary Report (10-01-22 @ 07:01):    No growth to date.    Culture - Blood (collected 09-30-22 @ 02:45)  Source: .Blood Blood-Peripheral  Preliminary Report (10-01-22 @ 07:01):    No growth to date.      Culture - Urine (collected 30 Sep 2022 06:49)  Source: Catheterized Catheterized  Final Report (01 Oct 2022 15:01):    >=3 organisms. Probable collection contamination.    Culture - Urine (collected 30 Sep 2022 05:43)  Source: Clean Catch Clean Catch (Midstream)  Final Report (01 Oct 2022 15:17):    >=3 organisms. Probable collection contamination.      < from: US Duplex Venous Lower Ext Complete, Bilateral (10.03.22 @ 09:32) >  ACC: [id number] EXAM:  US DPLX LWR EXT VEINS COMPL BI                          PROCEDURE DATE:  10/03/2022          INTERPRETATION:  CLINICAL INFORMATION: Lower extremity edema.    COMPARISON: None available.    TECHNIQUE: Duplex sonography of the BILATERAL LOWER extremity veins with   color and spectral Doppler, with and without compression.    FINDINGS:    RIGHT:  Normal compressibility of the RIGHT common femoral, femoral and popliteal   veins.  Doppler examination shows normal spontaneous andphasic flow.  Right peroneal vein is not visualized.    LEFT:  Normal compressibility of the LEFT common femoral, femoral and popliteal   veins.  Doppler examination shows normal spontaneous and phasic flow.  No LEFT calf vein thrombosis is detected.    IMPRESSION:  No evidence of deep venous thrombosis in either lower extremity.          --- End of Report ---          JESUS DIAZ MD; Resident Radiologist  This document has been electronically signed.  LEXUS JOSEPH MD; Attending Radiologist  This document has been electronically signed. Oct  3 2022 10:18AM    < end of copied text >

## 2022-10-03 NOTE — CONSULT NOTE ADULT - ASSESSMENT
**Full note to follow*  Assessment/Plan:     Wound Consult requested to assist w/ management of    Compression BLE  Consider ROBERT/PVR, XRay, Duplex, MRI, CT  BLE elevation  Abx per Medicine/ ID  Moisturize intact skin w/ SWEEN cream BID  Nutrition Consult for optimization as tolerated in pt w/ Protein Calorie Malnutirion        Inadequate PO intake        consider MVI & Vit C to promote wound healing        encourage high quality protein when appropriate  Hyperglycemia - consider HgA1c        FS w/ ISS q6h, consider Endo Consult  Anemia- transfusions, Fe studies  Continue turning and positioning w/ offloading assistive devices as per protocol  Continue w/ Pericare as per protocol  Waffle Cushion to chair when oob to chair  Continue w/ Bariatric low air loss fluidized bed surface         Upon discharge f/u as outpatient at [facility] [address] [place], [place] [phone]  Seen w/ Dr. Choudhury.    Thank you for this consult  KELLEY Malin, CWKAYLINN (pager #[phone])    If after 4PM or before 7:30AM on Mon-Friday or weekend/holiday please contact general surgery for urgent matters.   Team A- [id number]   Team B- [id number]  For non-urgent matters e-mail [email or web address]    We spent 70 minutes face to face with this patient of which more than 50% of the time was spent counseling & coordinating care of this pt     Assessment/Plan: 48 y/o male with PMH paraplegia, opioid dependence and withdrawal, GERD, neuromuscular dysfunction of bladder with chronic indwelling Corrigan DM2, MDD, diastolic and systolic CHF, SALLIE, HTN, ileostomy, sacral ulcer presenting [facility] and back pain and flank pain admitted to medicine for the management of acute uti and back pain.    Wound Consult requested to assist w/ management of chronic sacral stage 4 pressure injury.  Exam:  Sacrum to bilateral buttocks, posterior scrotum  - Large area of previously healed stage 4 pressure injury; now with scattered areas of eroded re-epithelialization exposing mix of pink and deep red agranular base with irregular borders.  - Small-moderate serosanguinous drainage, no odor.  - Wound edges with maceration  - Periwound skin without s/s of acute skin and infection/cellulitis.  - Patient with diverting Ostomy and indwelling corrigan catheter.  - Topical recommendations: cleanse wound and periwound skin with NS. Pat dry. Apply Liquid barrier film to periwound skin. Cover with large silicone foams with border. Change daily and prn.  - Continue to offload pressure; bariatric low airloss support surface, t&p per protocol with use of fluidized positioning devices, continue use of single breathable pad.  - Apply liquid barrier film to bilateral heels daily, continue to offload pressure with complete cair boots. Venous duplex negative for dvt b/l le.  - Nutrition Consult for optimization as tolerated        consider MVI & Vit C to promote wound healing        encourage high quality protein when appropriate  - Consider Ostomy nurse consult for assistance with Ostomy pouch while in patient if needed.      Upon discharge f/u as outpatient at [facility] [address] [place], [place] [phone]  Patient seen w/ Dr. Choudhury. Findings and plan discussed with patient and primary team ACP. All questions and concerns addressed to meet patient's satisfaction.  Will continue to follow periodically while inpatient, please reconsult earlier if needed.    Remainder of care per primary team.    Thank you for this consult  KELLEY Malin, CWKAYLINN (pager #[phone])    If after 4PM or before 7:30AM on Mon-Friday or weekend/holiday please contact general surgery for urgent matters.   Team A- [id number]   Team B- [id number]  For non-urgent matters e-mail [email or web address]    We spent 70 minutes face to face with this patient of which more than 50% of the time was spent counseling & coordinating care of this pt

## 2022-10-03 NOTE — PROVIDER CONTACT NOTE (OTHER) - ASSESSMENT
patient denies chest pain, dizziness and SOB. other VSS.
pt AxOx4. VSS. Pt had a slight tachy episode, HR in the 130's. Oral temp 99.6 Pt denying headache, chest pain, SOB. Pt is complaining of muscle spasms.

## 2022-10-03 NOTE — CHART NOTE - NSCHARTNOTEFT_GEN_A_CORE
Notified by RN that  patient had episode of tachycardia. As per RN heart rate went up to 130s  on tele monitor. Tele monitor reviewed, rhythm noted to be sinus tachycardia. Patient seen and assessed at bedside. Patient reports that he has generalized  muscle spasms. Patient reports that he takes Baclofen 20 mg po 4 times a day , However , current dose is 5 mg po TID. Medication dose to be clarified in AM. Patient denies fever, chills , chest pain, palpitations  shortness of breath and dizziness. Oral temp noted to be 99.8. Unable to take rectal as patient reports that his anal canal closed completely post surgery. Recommended RN to administer dose of baclofen and repeat temp after an hour. Patient is on Lopressor 25 mg BID. Will continue to monitor.
Patient ordered for Baclofen 5 mg TID, per patient outpatient dose is Baclofen 20 mg QID.  Medication record sent from [facility] reviewed, per record patient received Baclofen 20 mg 4 times daily.    Discussed w/ Dr. Mendoza, will continue outpatient dose.      Nancy Joyner NP-BC  Department of Medicine  In House Pager #[phone]

## 2022-10-03 NOTE — PROVIDER CONTACT NOTE (OTHER) - ACTION/TREATMENT ORDERED:
AS per ACP repeat temp within an hour. And administer Baclofen dose as ordered.
Provider Caren made aware. will continue to monitor

## 2022-10-03 NOTE — PROGRESS NOTE ADULT - PROBLEM SELECTOR PLAN 10
- DVT prophylaxis: Lovenox 40mg SC QD  - Sacral Decubitus - Will obtain Wound Consult for treatment recommendation. reports chronic constipation. takes senna bid, colace prn  reports less output on ostomy than usual.   -cont with senna bid and miralax prn

## 2022-10-03 NOTE — PROGRESS NOTE ADULT - PROBLEM SELECTOR PLAN 1
Patient with positive u/a, fever, back pain   - UA+ for leuk esterase, nitrite   - Ucx 3+ organisms, possible contamination.   - Clinically improving, obtain repeat Ucx  - c/w Tylenol PRN pain and/or fever  - blood culture ngtd  - c/w Ertapenem with plan for 7-10 day course for now   - trend WBC count Patient with positive u/a, fever, back pain   - UA+ for leuk esterase, nitrite   - Ucx 3+ organisms, possible contamination.   - Clinically improving, obtain repeat Ucx  - c/w Tylenol PRN pain and/or fever  - blood culture ngtd  - c/w Ertapenem 1g q24hr with plan for 7-10 day course for now (likely 9/30-10/6)  - trend WBC count

## 2022-10-04 LAB
CULTURE RESULTS: NO GROWTH — SIGNIFICANT CHANGE UP
SPECIMEN SOURCE: SIGNIFICANT CHANGE UP

## 2022-10-04 PROCEDURE — 99233 SBSQ HOSP IP/OBS HIGH 50: CPT

## 2022-10-04 RX ADMIN — Medication 25 MILLIGRAM(S): at 17:56

## 2022-10-04 RX ADMIN — Medication 80 MILLIGRAM(S): at 05:34

## 2022-10-04 RX ADMIN — OXYCODONE HYDROCHLORIDE 30 MILLIGRAM(S): 5 TABLET ORAL at 23:58

## 2022-10-04 RX ADMIN — Medication 6 MILLIGRAM(S): at 21:51

## 2022-10-04 RX ADMIN — DIVALPROEX SODIUM 500 MILLIGRAM(S): 500 TABLET, DELAYED RELEASE ORAL at 05:35

## 2022-10-04 RX ADMIN — Medication 20 MILLIGRAM(S): at 17:56

## 2022-10-04 RX ADMIN — OXYCODONE HYDROCHLORIDE 30 MILLIGRAM(S): 5 TABLET ORAL at 03:17

## 2022-10-04 RX ADMIN — GABAPENTIN 400 MILLIGRAM(S): 400 CAPSULE ORAL at 05:33

## 2022-10-04 RX ADMIN — Medication 1: at 17:57

## 2022-10-04 RX ADMIN — Medication 2: at 12:08

## 2022-10-04 RX ADMIN — Medication 1: at 08:43

## 2022-10-04 RX ADMIN — ENOXAPARIN SODIUM 40 MILLIGRAM(S): 100 INJECTION SUBCUTANEOUS at 16:52

## 2022-10-04 RX ADMIN — INSULIN GLARGINE 50 UNIT(S): 100 INJECTION, SOLUTION SUBCUTANEOUS at 21:50

## 2022-10-04 RX ADMIN — DULOXETINE HYDROCHLORIDE 60 MILLIGRAM(S): 30 CAPSULE, DELAYED RELEASE ORAL at 11:21

## 2022-10-04 RX ADMIN — ERTAPENEM SODIUM 120 MILLIGRAM(S): 1 INJECTION, POWDER, LYOPHILIZED, FOR SOLUTION INTRAMUSCULAR; INTRAVENOUS at 05:31

## 2022-10-04 RX ADMIN — CYCLOBENZAPRINE HYDROCHLORIDE 10 MILLIGRAM(S): 10 TABLET, FILM COATED ORAL at 19:58

## 2022-10-04 RX ADMIN — Medication 20 MILLIGRAM(S): at 05:34

## 2022-10-04 RX ADMIN — TRAMADOL HYDROCHLORIDE 50 MILLIGRAM(S): 50 TABLET ORAL at 06:54

## 2022-10-04 RX ADMIN — Medication 1000 UNIT(S): at 11:21

## 2022-10-04 RX ADMIN — OXYCODONE HYDROCHLORIDE 30 MILLIGRAM(S): 5 TABLET ORAL at 17:56

## 2022-10-04 RX ADMIN — DIVALPROEX SODIUM 500 MILLIGRAM(S): 500 TABLET, DELAYED RELEASE ORAL at 17:57

## 2022-10-04 RX ADMIN — OXYCODONE HYDROCHLORIDE 30 MILLIGRAM(S): 5 TABLET ORAL at 02:47

## 2022-10-04 RX ADMIN — TRAMADOL HYDROCHLORIDE 50 MILLIGRAM(S): 50 TABLET ORAL at 14:01

## 2022-10-04 RX ADMIN — SENNA PLUS 2 TABLET(S): 8.6 TABLET ORAL at 17:56

## 2022-10-04 RX ADMIN — TRAMADOL HYDROCHLORIDE 50 MILLIGRAM(S): 50 TABLET ORAL at 22:51

## 2022-10-04 RX ADMIN — Medication 20 MILLIGRAM(S): at 23:58

## 2022-10-04 RX ADMIN — TRAMADOL HYDROCHLORIDE 50 MILLIGRAM(S): 50 TABLET ORAL at 05:35

## 2022-10-04 RX ADMIN — GABAPENTIN 400 MILLIGRAM(S): 400 CAPSULE ORAL at 21:51

## 2022-10-04 RX ADMIN — GABAPENTIN 400 MILLIGRAM(S): 400 CAPSULE ORAL at 14:01

## 2022-10-04 RX ADMIN — Medication 25 MILLIGRAM(S): at 05:34

## 2022-10-04 RX ADMIN — TRAMADOL HYDROCHLORIDE 50 MILLIGRAM(S): 50 TABLET ORAL at 21:51

## 2022-10-04 NOTE — DIETITIAN INITIAL EVALUATION ADULT - NSFNSPHYEXAMSKINFT_GEN_A_CORE
As per flow sheet, patient has stage III pressure injury to lower, back, sacrum, unstageable pressure injury to left, lateral foot and right foot and right plantar.

## 2022-10-04 NOTE — DIETITIAN INITIAL EVALUATION ADULT - ORAL INTAKE PTA/DIET HISTORY
Patient reports height of 5'10", usual body weight of 340lbs. Patient reports having good appetite prior to hospitalization, reports weight gain due to good po intake. Patient has no difficulty in obtaining grocery/ food at home. Patient has no known food allergies, does not follow a special diet at home.

## 2022-10-04 NOTE — PROGRESS NOTE ADULT - PROBLEM SELECTOR PLAN 1
Patient with positive u/a, fever, back pain   - UA+ for leuk esterase, nitrite   - Ucx 3+ organisms, possible contamination.   - Clinically improving, obtain repeat Ucx (still pending results, confirmed collected)   - c/w Tylenol PRN pain and/or fever  - blood culture ngtd  - c/w Ertapenem 1g q24hr with plan for 7-10 day course for now (likely 9/30-10/6)  - trend WBC count

## 2022-10-04 NOTE — DIETITIAN INITIAL EVALUATION ADULT - NS FNS DIET ORDER
Diet, Regular:   Consistent Carbohydrate {No Snacks} (CSTCHO)  DASH/TLC {Sodium & Cholesterol Restricted} (DASH) (09-30-22 @ 10:42)

## 2022-10-04 NOTE — DIETITIAN INITIAL EVALUATION ADULT - ORAL NUTRITION SUPPLEMENTS
1. Recommend adding Prosource 2x/day (120kcal, 30gm protein) for nutrient support.   2. Recommend adding MVI, vit C to promote wound healing.

## 2022-10-04 NOTE — DIETITIAN INITIAL EVALUATION ADULT - PERTINENT MEDS FT
MEDICATIONS  (STANDING):  cholecalciferol 1000 Unit(s) Oral daily  dextrose 5%. 1000 milliLiter(s) (50 mL/Hr) IV Continuous <Continuous>  dextrose 5%. 1000 milliLiter(s) (100 mL/Hr) IV Continuous <Continuous>  dextrose 50% Injectable 25 Gram(s) IV Push once  dextrose 50% Injectable 12.5 Gram(s) IV Push once  dextrose 50% Injectable 25 Gram(s) IV Push once  diVALproex  milliGRAM(s) Oral every 12 hours  DULoxetine 60 milliGRAM(s) Oral daily  enoxaparin Injectable 40 milliGRAM(s) SubCutaneous every 24 hours  ertapenem  IVPB 1000 milliGRAM(s) IV Intermittent every 24 hours  ferrous    sulfate 325 milliGRAM(s) Oral <User Schedule>  furosemide    Tablet 80 milliGRAM(s) Oral daily  gabapentin 400 milliGRAM(s) Oral every 8 hours  glucagon  Injectable 1 milliGRAM(s) IntraMuscular once  influenza   Vaccine 0.5 milliLiter(s) IntraMuscular once  insulin glargine Injectable (LANTUS) 50 Unit(s) SubCutaneous at bedtime  insulin lispro (ADMELOG) corrective regimen sliding scale   SubCutaneous three times a day before meals  insulin lispro (ADMELOG) corrective regimen sliding scale   SubCutaneous at bedtime  melatonin 6 milliGRAM(s) Oral at bedtime  metoprolol tartrate 25 milliGRAM(s) Oral two times a day  senna 2 Tablet(s) Oral two times a day  traMADol 50 milliGRAM(s) Oral every 8 hours    MEDICATIONS  (PRN):  acetaminophen     Tablet .. 650 milliGRAM(s) Oral every 6 hours PRN Temp greater or equal to 38C (100.4F), Mild Pain (1 - 3)  aluminum hydroxide/magnesium hydroxide/simethicone Suspension 30 milliLiter(s) Oral every 4 hours PRN Dyspepsia  baclofen 20 milliGRAM(s) Oral every 6 hours PRN Muscle Spasm  cyclobenzaprine 10 milliGRAM(s) Oral two times a day PRN Muscle Spasm  dextrose Oral Gel 15 Gram(s) Oral once PRN Blood Glucose LESS THAN 70 milliGRAM(s)/deciliter  oxyCODONE    IR 30 milliGRAM(s) Oral every 6 hours PRN Moderate Pain (4 - 6)  polyethylene glycol 3350 17 Gram(s) Oral daily PRN Constipation

## 2022-10-04 NOTE — DIETITIAN INITIAL EVALUATION ADULT - PERTINENT LABORATORY DATA
10-03    133<L>  |  99  |  13  ----------------------------<  154<H>  4.9   |  27  |  0.76    Ca    8.3<L>      03 Oct 2022 07:03  Phos  3.6     10-03  Mg     2.00     10-03    TPro  7.0  /  Alb  2.9<L>  /  TBili  0.4  /  DBili  x   /  AST  15  /  ALT  8   /  AlkPhos  104  10-03  POCT Blood Glucose.: 187 mg/dL (10-04-22 @ 17:04)  A1C with Estimated Average Glucose Result: 7.9 % (09-30-22 @ 11:30)

## 2022-10-04 NOTE — DIETITIAN INITIAL EVALUATION ADULT - OTHER INFO
48 y/o male with PMH paraplegia, opioid dependence and withdrawal, GERD, neuromuscular dysfunction of bladder with chronic indwelling [place] DM2, MDD, diastolic and systolic CHF, SALLIE, HTN, ileostomy, sacral ulcer presenting from [facility] for fever and back pain and flank pain admitted to medicine for the management of acute uti and back pain, per chart.   Patient reports good appetite during visit. Denies any GI distress, any difficulty chewing or swallowing during visit, patient reports last bowel movement 10/4/2022. Patient is currently on bowel regimen. As per flow sheet, patient has no weight documented. Weight obtained via bed scale during visit, 164.2kg/ 362lbs, noted with weight gain of 22lbs/6% BW, compare to reported usual body weight. As per flow sheet, patient has 1+ generalized edema, fluid shift might contribute to weight change. As per chart review, patient is on cholecalciferol, ferrous sulfate for micronutrient support.   RD provided verbal nutrition education on heart healthy and diabetes diet, including increase fiber/ vegetables intake, avoid salt in cooking, avoid processed foods, avoid concentrated sweets or beverages, portion control. Patient is receptive to information provided.

## 2022-10-04 NOTE — DIETITIAN INITIAL EVALUATION ADULT - WEIGHT FOR BMI (KG)
Nurse spoke with mother. Mom wanted to inform Dr BAEZ that over the weekend she did try his recommendations of waking him a few hours later for a bathroom break. Per mom it did help Chandler sleep with no waking up but he is still very restless while sleeping. [place] is very agitated in the mornings still due to being so restless during sleep at night. Mom wanted to speak with Dr BAEZ about if she should still try the second dose of clonidine. Please advise. [phone]. 164.2

## 2022-10-04 NOTE — PROGRESS NOTE ADULT - PROBLEM SELECTOR PLAN 10
reports chronic constipation. takes senna bid, colace prn  Improved output on ostomy today  -cont with senna bid and miralax prn

## 2022-10-05 LAB
ANION GAP SERPL CALC-SCNC: 7 MMOL/L — SIGNIFICANT CHANGE UP (ref 7–14)
BUN SERPL-MCNC: 14 MG/DL — SIGNIFICANT CHANGE UP (ref 7–23)
CALCIUM SERPL-MCNC: 8 MG/DL — LOW (ref 8.4–10.5)
CHLORIDE SERPL-SCNC: 97 MMOL/L — LOW (ref 98–107)
CO2 SERPL-SCNC: 29 MMOL/L — SIGNIFICANT CHANGE UP (ref 22–31)
CREAT SERPL-MCNC: 0.57 MG/DL — SIGNIFICANT CHANGE UP (ref 0.5–1.3)
CULTURE RESULTS: SIGNIFICANT CHANGE UP
CULTURE RESULTS: SIGNIFICANT CHANGE UP
EGFR: 123 ML/MIN/1.73M2 — SIGNIFICANT CHANGE UP
GLUCOSE SERPL-MCNC: 240 MG/DL — HIGH (ref 70–99)
HCT VFR BLD CALC: 28.2 % — LOW (ref 39–50)
HGB BLD-MCNC: 8 G/DL — LOW (ref 13–17)
MAGNESIUM SERPL-MCNC: 1.9 MG/DL — SIGNIFICANT CHANGE UP (ref 1.6–2.6)
MCHC RBC-ENTMCNC: 22.8 PG — LOW (ref 27–34)
MCHC RBC-ENTMCNC: 28.4 GM/DL — LOW (ref 32–36)
MCV RBC AUTO: 80.3 FL — SIGNIFICANT CHANGE UP (ref 80–100)
NRBC # BLD: 0 /100 WBCS — SIGNIFICANT CHANGE UP (ref 0–0)
NRBC # FLD: 0 K/UL — SIGNIFICANT CHANGE UP (ref 0–0)
PHOSPHATE SERPL-MCNC: 3.5 MG/DL — SIGNIFICANT CHANGE UP (ref 2.5–4.5)
PLATELET # BLD AUTO: 196 K/UL — SIGNIFICANT CHANGE UP (ref 150–400)
POTASSIUM SERPL-MCNC: 4.2 MMOL/L — SIGNIFICANT CHANGE UP (ref 3.5–5.3)
POTASSIUM SERPL-SCNC: 4.2 MMOL/L — SIGNIFICANT CHANGE UP (ref 3.5–5.3)
RBC # BLD: 3.51 M/UL — LOW (ref 4.2–5.8)
RBC # FLD: 17.4 % — HIGH (ref 10.3–14.5)
SODIUM SERPL-SCNC: 133 MMOL/L — LOW (ref 135–145)
SPECIMEN SOURCE: SIGNIFICANT CHANGE UP
SPECIMEN SOURCE: SIGNIFICANT CHANGE UP
WBC # BLD: 3.45 K/UL — LOW (ref 3.8–10.5)
WBC # FLD AUTO: 3.45 K/UL — LOW (ref 3.8–10.5)

## 2022-10-05 PROCEDURE — 99233 SBSQ HOSP IP/OBS HIGH 50: CPT

## 2022-10-05 RX ADMIN — ENOXAPARIN SODIUM 40 MILLIGRAM(S): 100 INJECTION SUBCUTANEOUS at 15:44

## 2022-10-05 RX ADMIN — TRAMADOL HYDROCHLORIDE 50 MILLIGRAM(S): 50 TABLET ORAL at 06:22

## 2022-10-05 RX ADMIN — CYCLOBENZAPRINE HYDROCHLORIDE 10 MILLIGRAM(S): 10 TABLET, FILM COATED ORAL at 18:33

## 2022-10-05 RX ADMIN — TRAMADOL HYDROCHLORIDE 50 MILLIGRAM(S): 50 TABLET ORAL at 21:55

## 2022-10-05 RX ADMIN — Medication 1: at 21:53

## 2022-10-05 RX ADMIN — OXYCODONE HYDROCHLORIDE 30 MILLIGRAM(S): 5 TABLET ORAL at 08:52

## 2022-10-05 RX ADMIN — INSULIN GLARGINE 50 UNIT(S): 100 INJECTION, SOLUTION SUBCUTANEOUS at 21:54

## 2022-10-05 RX ADMIN — GABAPENTIN 400 MILLIGRAM(S): 400 CAPSULE ORAL at 06:22

## 2022-10-05 RX ADMIN — GABAPENTIN 400 MILLIGRAM(S): 400 CAPSULE ORAL at 21:54

## 2022-10-05 RX ADMIN — Medication 6 MILLIGRAM(S): at 21:54

## 2022-10-05 RX ADMIN — GABAPENTIN 400 MILLIGRAM(S): 400 CAPSULE ORAL at 13:09

## 2022-10-05 RX ADMIN — Medication 20 MILLIGRAM(S): at 06:23

## 2022-10-05 RX ADMIN — OXYCODONE HYDROCHLORIDE 30 MILLIGRAM(S): 5 TABLET ORAL at 15:44

## 2022-10-05 RX ADMIN — Medication 3: at 12:34

## 2022-10-05 RX ADMIN — SENNA PLUS 2 TABLET(S): 8.6 TABLET ORAL at 18:32

## 2022-10-05 RX ADMIN — Medication 20 MILLIGRAM(S): at 19:18

## 2022-10-05 RX ADMIN — CYCLOBENZAPRINE HYDROCHLORIDE 10 MILLIGRAM(S): 10 TABLET, FILM COATED ORAL at 08:52

## 2022-10-05 RX ADMIN — DIVALPROEX SODIUM 500 MILLIGRAM(S): 500 TABLET, DELAYED RELEASE ORAL at 06:22

## 2022-10-05 RX ADMIN — Medication 325 MILLIGRAM(S): at 08:52

## 2022-10-05 RX ADMIN — ERTAPENEM SODIUM 120 MILLIGRAM(S): 1 INJECTION, POWDER, LYOPHILIZED, FOR SOLUTION INTRAMUSCULAR; INTRAVENOUS at 06:22

## 2022-10-05 RX ADMIN — Medication 3: at 17:31

## 2022-10-05 RX ADMIN — DULOXETINE HYDROCHLORIDE 60 MILLIGRAM(S): 30 CAPSULE, DELAYED RELEASE ORAL at 13:10

## 2022-10-05 RX ADMIN — TRAMADOL HYDROCHLORIDE 50 MILLIGRAM(S): 50 TABLET ORAL at 13:09

## 2022-10-05 RX ADMIN — Medication 2: at 08:28

## 2022-10-05 RX ADMIN — DIVALPROEX SODIUM 500 MILLIGRAM(S): 500 TABLET, DELAYED RELEASE ORAL at 18:32

## 2022-10-05 RX ADMIN — TRAMADOL HYDROCHLORIDE 50 MILLIGRAM(S): 50 TABLET ORAL at 14:00

## 2022-10-05 RX ADMIN — Medication 20 MILLIGRAM(S): at 13:10

## 2022-10-05 RX ADMIN — Medication 1000 UNIT(S): at 13:10

## 2022-10-05 RX ADMIN — SENNA PLUS 2 TABLET(S): 8.6 TABLET ORAL at 06:11

## 2022-10-05 NOTE — DISCHARGE NOTE PROVIDER - ATTENDING DISCHARGE PHYSICAL EXAMINATION:
VITAL SIGNS:  T(C): 36.7 (10-06-22 @ 13:22), Max: 36.8 (10-05-22 @ 18:48)  T(F): 98 (10-06-22 @ 13:22), Max: 98.3 (10-06-22 @ 05:36)  HR: 54 (10-06-22 @ 13:22) (54 - 68)  BP: 145/62 (10-06-22 @ 10:05) (92/41 - 158/58)  BP(mean): --  RR: 17 (10-06-22 @ 13:22) (16 - 18)  SpO2: 98% (10-06-22 @ 13:22) (97% - 100%)  Wt(kg): --    PHYSICAL EXAM:  Constitutional: WDWN resting comfortably in bed; NAD, Morbidly obese  Head: NC/AT  Eyes: PERRL, anicteric sclera  Respiratory: CTA B/L; no W/R/R  Cardiac: +S1/S2; RRR; no M/R/G  Gastrointestinal: soft, NT/ND; no rebound or guarding; +BSx4  Extremities: WWP, no clubbing or cyanosis; trace edema  Musculoskeletal: NROM x4; no joint swelling, tenderness or erythema  Vascular: 2+ radial, DP/PT pulses B/L  Neurologic: AAOx3; CNII-XII grossly intact; no focal deficits

## 2022-10-05 NOTE — PROGRESS NOTE ADULT - REASON FOR ADMISSION
Back pain, flank pain, fever

## 2022-10-05 NOTE — DISCHARGE NOTE PROVIDER - NSDCFUADDINST_GEN_ALL_CORE_FT
Wound Consult requested to assist w/ management of chronic sacral stage 4 pressure injury.  Exam:  Sacrum to bilateral buttocks, posterior scrotum  - Large area of previously healed stage 4 pressure injury; now with scattered areas of eroded re-epithelialization exposing mix of pink and deep red agranular base with irregular borders.  - Small-moderate serosanguinous drainage, no odor.  - Wound edges with maceration  - Periwound skin without s/s of acute skin and infection/cellulitis.  - Topical recommendations: cleanse wound and periwound skin with NS. Pat dry. Apply Liquid barrier film to periwound skin. Cover with large silicone foams with border. Change daily and prn.  Upon discharge follow up as outpatient at [facility] [address] [facility] [phone]

## 2022-10-05 NOTE — PROGRESS NOTE ADULT - PROBLEM SELECTOR PROBLEM 8
MDD (major depressive disorder)

## 2022-10-05 NOTE — PROGRESS NOTE ADULT - PROBLEM SELECTOR PLAN 3
- h/o combined systolic/diastolic HF +1 pitting edema on Lasix 80 mg BID, Spironolactone, Metoprolol Tartrate 25 mg BID at rehab  - unknown status of CHF  [ ] echocardiogram for assessment of EF- pending   - continue Lasix 80 mg BID for now  - Strict I&Os, monitor daily weights, 1500 cc fluid restriction, sodium restriction.  - CHF consult if needed
- h/o combined systolic/diastolic HF +1 pitting edema on Lasix 80 mg BID, Spironolactone, Metoprolol Tartrate 25 mg BID at rehab  - unknown status of CHF  [ ] echocardiogram for assessment of EF  - continue Lasix 80 mg BID for now  - Strict I&Os, monitor daily weights, 1500 cc fluid restriction, sodium restriction.  - CHF consult if needed

## 2022-10-05 NOTE — DISCHARGE NOTE PROVIDER - NSDCMRMEDTOKEN_GEN_ALL_CORE_FT
ACETAMINOPHEN 500 MG TABLET: TAKE 2 TABLETS BY MOUTH 3 TIMES A DAY  BACLOFEN 20 MG TABLET: TAKE 1/2 A TABLET BY MOUTH 3 TIMES A DAY.  Basaglar KwikPen 100 units/mL subcutaneous solution: 100 unit(s) subcutaneous 2 times a day 9AM and 9PM  CYCLOBENZAPRINE 10 MG TABLET: TAKE 1 TABLET BY MOUTH TWICE A DAY AS NEEDED  DIVALPROEX SOD  MG TAB: TAKE 1 TABLET BY MOUTH EVERY 6 HOURS IF NEEDED FOR AGITATION DO NOT CRUSH, CHEW, OR SPLIT.  DULOXETINE HCL DR 60 MG CAP: TAKE 1 CAP BY MOUTH EVERY DAY  FUROSEMIDE 80 MG TABLET: TAKE 1 TABLET (80 MG TOTAL) BY MOUTH 2 (TWO) TIMES DAILY MORNING AND AFTERNOON.  GABAPENTIN 400 MG CAPSULE: TAKE 1 CAPSULE BY MOUTH EVERY 8 HOURS  Melatonin 5 mg oral capsule: 1 cap(s) orally once a day (at bedtime)  METOPROLOL TARTRATE 50 MG TAB: TAKE 1 TABLET BY MOUTH TWICE A DAY  NovoLOG 100 units/mL injectable solution: 10 unit(s) injectable 3 times a day (before meals)  Senna 8.6 mg oral tablet: 2 tab(s) orally once a day (at bedtime)  SPIRONOLACTONE 25 MG TABLET: TAKE 1 TABLET BY MOUTH EVERY DAY  traMADol 50 mg oral tablet: 1 tab(s) orally every 8 hours  Vitamin D3 25 mcg (1000 intl units) oral tablet: 1 tab(s) orally once a day   acetaminophen 325 mg oral tablet: 2 tab(s) orally every 6 hours, As needed, Temp greater or equal to 38C (100.4F), Mild Pain (1 - 3)  aluminum hydroxide-magnesium hydroxide 200 mg-200 mg/5 mL oral suspension: 30 milliliter(s) orally every 4 hours, As needed, Dyspepsia  baclofen 20 mg oral tablet: 1 tab(s) orally every 6 hours, As needed, Muscle Spasm  Basaglar KwikPen 100 units/mL subcutaneous solution: 100 unit(s) subcutaneous 2 times a day 9AM and 9PM  cholecalciferol oral tablet: 1000 unit(s) orally once a day  cyclobenzaprine 10 mg oral tablet: 1 tab(s) orally 2 times a day, As needed, Muscle Spasm  divalproex sodium 500 mg oral delayed release tablet: 1 tab(s) orally every 12 hours  DULoxetine 60 mg oral delayed release capsule: 1 cap(s) orally once a day  ferrous sulfate 325 mg (65 mg elemental iron) oral tablet: 1 tab(s) orally every 48 hours  furosemide 80 mg oral tablet: 1 tab(s) orally once a day  gabapentin 400 mg oral capsule: 1 cap(s) orally every 8 hours  melatonin 3 mg oral tablet: 2 tab(s) orally once a day (at bedtime)  metoprolol tartrate 25 mg oral tablet: 1 tab(s) orally 2 times a day  NovoLOG 100 units/mL injectable solution: 10 unit(s) injectable 3 times a day (before meals)  oxyCODONE 30 mg oral tablet: 1 tab(s) orally every 6 hours, As needed, Moderate Pain (4 - 6)  polyethylene glycol 3350 oral powder for reconstitution: 17 gram(s) orally once a day, As needed, Constipation  senna leaf extract oral tablet: 2 tab(s) orally 2 times a day  SPIRONOLACTONE 25 MG TABLET: TAKE 1 TABLET BY MOUTH EVERY DAY  traMADol 50 mg oral tablet: 1 tab(s) orally every 8 hours   acetaminophen 325 mg oral tablet: 2 tab(s) orally every 6 hours, As needed, Temp greater or equal to 38C (100.4F), Mild Pain (1 - 3)  aluminum hydroxide-magnesium hydroxide 200 mg-200 mg/5 mL oral suspension: 30 milliliter(s) orally every 4 hours, As needed, Dyspepsia  baclofen 20 mg oral tablet: 1 tab(s) orally every 6 hours, As needed, Muscle Spasm  Basaglar KwikPen 100 units/mL subcutaneous solution: 100 unit(s) subcutaneous 2 times a day 9AM and 9PM  cholecalciferol oral tablet: 1000 unit(s) orally once a day  cyclobenzaprine 10 mg oral tablet: 1 tab(s) orally 2 times a day, As needed, Muscle Spasm  divalproex sodium 500 mg oral delayed release tablet: 1 tab(s) orally every 12 hours  DULoxetine 60 mg oral delayed release capsule: 1 cap(s) orally once a day  ferrous sulfate 325 mg (65 mg elemental iron) oral tablet: 1 tab(s) orally every 48 hours  furosemide 80 mg oral tablet: 1 tab(s) orally once a day  gabapentin 400 mg oral capsule: 1 cap(s) orally every 8 hours  melatonin 3 mg oral tablet: 2 tab(s) orally once a day (at bedtime)  metoprolol tartrate 25 mg oral tablet: 1 tab(s) orally 2 times a day  NovoLOG 100 units/mL injectable solution: 10 unit(s) injectable 3 times a day (before meals)  oxyCODONE 30 mg oral tablet: 1 tab(s) orally every 6 hours, As needed, Moderate Pain (4 - 6)  polyethylene glycol 3350 oral powder for reconstitution: 17 gram(s) orally once a day, As needed, Constipation  senna leaf extract oral tablet: 2 tab(s) orally 2 times a day  traMADol 50 mg oral tablet: 1 tab(s) orally every 8 hours   acetaminophen 325 mg oral tablet: 2 tab(s) orally every 6 hours, As needed, Temp greater or equal to 38C (100.4F), Mild Pain (1 - 3)  aluminum hydroxide-magnesium hydroxide 200 mg-200 mg/5 mL oral suspension: 30 milliliter(s) orally every 4 hours, As needed, Dyspepsia  baclofen 20 mg oral tablet: 1 tab(s) orally every 6 hours, As needed, Muscle Spasm  Basaglar KwikPen 100 units/mL subcutaneous solution: 50 unit(s) subcutaneous once a day (at bedtime)  cholecalciferol oral tablet: 1000 unit(s) orally once a day  cyclobenzaprine 10 mg oral tablet: 1 tab(s) orally 2 times a day, As needed, Muscle Spasm  divalproex sodium 500 mg oral delayed release tablet: 1 tab(s) orally every 12 hours  DULoxetine 60 mg oral delayed release capsule: 1 cap(s) orally once a day  ferrous sulfate 325 mg (65 mg elemental iron) oral tablet: 1 tab(s) orally every 48 hours  furosemide 80 mg oral tablet: 1 tab(s) orally once a day  gabapentin 400 mg oral capsule: 1 cap(s) orally every 8 hours  melatonin 3 mg oral tablet: 2 tab(s) orally once a day (at bedtime)  metoprolol tartrate 25 mg oral tablet: 1 tab(s) orally 2 times a day  NovoLOG 100 units/mL injectable solution: 10 unit(s) injectable 3 times a day (before meals)  oxyCODONE 30 mg oral tablet: 1 tab(s) orally every 6 hours, As needed, Moderate Pain (4 - 6)  polyethylene glycol 3350 oral powder for reconstitution: 17 gram(s) orally once a day, As needed, Constipation  senna leaf extract oral tablet: 2 tab(s) orally 2 times a day  traMADol 50 mg oral tablet: 1 tab(s) orally every 8 hours

## 2022-10-05 NOTE — DISCHARGE NOTE PROVIDER - PROVIDER TOKENS
FREE:[LAST:[Madhu],FIRST:[],PHONE:[(   )    -],FAX:[(   )    -],ADDRESS:[Please call and schedule outpatient follow up wityh your cardiologist with in 1 week of discharge for ongoing cardiac/medical management of your heart failure  Provider located in [place], [place]]],FREE:[LAST:[Primary Care Provider],PHONE:[(   )    -],FAX:[(   )    -],ADDRESS:[Please call and schedule outpatient follow up with your physician with in 1 week of discharge for ongoing cardiac/medical management   Provider located in [place], [place]]]

## 2022-10-05 NOTE — PROGRESS NOTE ADULT - PROBLEM SELECTOR PROBLEM 3
Chronic combined systolic and diastolic heart failure

## 2022-10-05 NOTE — PROGRESS NOTE ADULT - PROBLEM SELECTOR PLAN 2
Patient with elevated Troponin of 58, 66  EKG , RBBB  [ ] Add on Deion to AM labs   CP free, suspect Type 2 in setting of sepsis   [ ] TTE for wall motion evaluation and EF  CXR - The base of the bilateral lungs is not imaged. Within the visualized portions of the lungs, there is no consolidation. No pneumothorax. No large pleural effusion within the limitations of exam.
Patient with elevated Troponin of 58, 66  EKG , RBBB  CP free, suspect Type 2 in setting of sepsis   [ ] TTE for wall motion evaluation and EF  CXR - The base of the bilateral lungs is not imaged. Within the visualized portions of the lungs, there is no consolidation. No pneumothorax. No large pleural effusion within the limitations of exam.

## 2022-10-05 NOTE — PROGRESS NOTE ADULT - PROBLEM SELECTOR PLAN 4
- Pain control as clinically indicated - Continue with Gabapentin, Toradol, Baclofen and Cyclobenzaprine  - Pain improved for now, can trial PRN Tramadol if necessary  - Physical therapy: Back to [facility]    # LE erythema  [ ] LE dopplers
- Pain control as clinically indicated - Continue with Gabapentin, Toradol, Baclofen and Cyclobenzaprine  - Pain improved for now, can trial PRN Tramadol if necessary  - Physical therapy: Back to [facility]    # KAILYN erythema  - LE dopplers with no findings of Thrombus
- Pain control as clinically indicated - Continue with Gabapentin, Toradol, Baclofen and Cyclobenzaprine  - Pain improved for now, can trial PRN Tramadol if necessary  - Physical therapy: Back to [facility]    # LE erythema  [ ] LE dopplers

## 2022-10-05 NOTE — DISCHARGE NOTE PROVIDER - NSDCCPCAREPLAN_GEN_ALL_CORE_FT
PRINCIPAL DISCHARGE DIAGNOSIS  Diagnosis: Pyelonephritis  Assessment and Plan of Treatment: You presented with positive urinalysis, with fever, back pain. Your urinalysis was positive for infection.You completed IV antibiotic course. Your symptoms improved, and your urine culture and blood cultures were negative for any growth.  Monitor for signs/symptoms of infection, such as, fever/chills, burning/pain with urination, urinary frequency/hesitancy, cloudy urine, or blood in urine. Please follow up with your primary care physician regarding your hospitalization        SECONDARY DISCHARGE DIAGNOSES  Diagnosis: MDD (major depressive disorder)  Assessment and Plan of Treatment: Continue your medication Duloxetine 60 mg,  please follow-up as an outpatient with your primary care provider for further care and recommendations.    Diagnosis: HTN (hypertension)  Assessment and Plan of Treatment: Continue blood pressure medication regimen as directed. Monitor for any visual changes, headaches or dizziness.  Monitor blood pressure regularly.  Follow up with your PCP for further management for high blood pressure.      Diagnosis: SALLIE (obstructive sleep apnea)  Assessment and Plan of Treatment:     Diagnosis: Chronic back pain  Assessment and Plan of Treatment: Pain control improved continue with Gabapentin, Toradol, Baclofen and Cyclobenzaprine. PT recommended [facility]. Please follow up with your primary care physician regarding your hospitalization in 1 week following discharge      Diagnosis: DM (diabetes mellitus)  Assessment and Plan of Treatment: Continue your medication regimen and a consistent carbohydrate diet (Meaning eating the same amount of carbohydrates at the same time each day). Monitor blood glucose levels throughout the day before meals and at bedtime. Record blood sugars and bring to outpatient providers appointment in order to be reviewed by your doctor for management modifications. If your sugars are more than 400 or less than 70 you should contact your PCP immediately. Monitor for signs/symptoms of low blood glucose, such as, dizziness, altered mental status, or cool/clammy skin. In addition, monitor for signs/symptoms of high blood glucose, such as, feeling hot, dry, fatigued, or with increased thirst/urination. Make regular podiatry appointments in order to have feet checked for wounds and uncontrolled toe nail growth to prevent infections, as well as, appointments with an ophthalmologist to monitor your vision.      Diagnosis: Anemia  Assessment and Plan of Treatment: Your blood count has been stable.    Diagnosis: Constipation  Assessment and Plan of Treatment: Continue on Bowel regimen     PRINCIPAL DISCHARGE DIAGNOSIS  Diagnosis: Pyelonephritis  Assessment and Plan of Treatment: You presented with fever, and back pain. Your urinalysis was positive for infection.You completed IV antibiotic course. Your symptoms improved, and your urine culture and blood cultures were negative for any growth.  Monitor for signs/symptoms of infection, such as, fever/chills, burning/pain with urination, urinary frequency/hesitancy, cloudy urine, or blood in urine. Please follow up with your primary care physician regarding your hospitalization        SECONDARY DISCHARGE DIAGNOSES  Diagnosis: MDD (major depressive disorder)  Assessment and Plan of Treatment: Continue your medication Duloxetine 60 mg,  please follow-up as an outpatient with your primary care provider for further care and recommendations. Please follow up with your primary care and psychiatrist within in 1-2 weeks for further medical management.  Call your psychiatrist immediately if you feel suicidal or if you need to talk to someone.  Exercise 30 minutes daily as tolerated.With the goal to increase understanding of depressive feelings, and to alleviate depressed mood and return to previous level of normal functioning.    Diagnosis: HTN (hypertension)  Assessment and Plan of Treatment: Continue blood pressure medication regimen as directed. Monitor for any visual changes, headaches or dizziness.  Monitor blood pressure regularly.   Follow up with your Primary Care Physician  for further management for high blood pressure.    Diagnosis: SALLIE (obstructive sleep apnea)  Assessment and Plan of Treatment: Continue using CPAP at bedtime    Diagnosis: Chronic back pain  Assessment and Plan of Treatment: Pain control improved continue with Gabapentin, Toradol, Baclofen and Cyclobenzaprine. Physical Therapy evaluated you and  recommended Sub Acute Rehab. Please follow up with your primary care physician regarding your hospitalization in 1 week following discharge.      Diagnosis: Chronic combined systolic and diastolic heart failure  Assessment and Plan of Treatment: You have a history of having combined systolic/diastolic heart failure. Continue recommended medication regimen Lasix ,  and Metoprolol Tartrate . You had an echocardiogram with preserved heart function. Monitor for signs/symptoms of fluid overload and electrolyte abnormalities, such as, shortness of breath, cough, swelling, chest discomfort, changes in heart rate, dizziness, fainting, or changes in mental status. Continue monitor daily weights, follow 1500 cc fluid restriction, along with sodium restriction.  Follow heart healthy diet. If you develop severe lower extremity swelling and shortness of breath please seek medial attention. Please call and schediule outpatient follow-up with your Primary Care Physician and Cardiologist within 1 week once you've been discharged from the hospital.    Diagnosis: DM (diabetes mellitus)  Assessment and Plan of Treatment: Your A1c was 7.9  Continue your medication regimen and a consistent carbohydrate diet (Meaning eating the same amount of carbohydrates at the same time each day). Monitor blood glucose levels throughout the day before meals and at bedtime. Record blood sugars and bring to outpatient providers appointment in order to be reviewed by your doctor for management modifications. If your sugars are more than 400 or less than 70 you should contact your PCP immediately. Monitor for signs/symptoms of low blood glucose, such as, dizziness, altered mental status, or cool/clammy skin. In addition, monitor for signs/symptoms of high blood glucose, such as, feeling hot, dry, fatigued, or with increased thirst/urination. Make regular podiatry appointments in order to have feet checked for wounds and uncontrolled toe nail growth to prevent infections, as well as, appointments with an ophthalmologist to monitor your vision.      Diagnosis: Anemia  Assessment and Plan of Treatment: Your blood count has been stable. Please follow up with your primary care physician for further management and monitoring of your anemia. Monitor for signs/symptoms indicating worsening of disease, such as, easy bleeding/bruising, pale skin, fatigue, dizziness, increased heart rate, or chest pain.      Diagnosis: Constipation  Assessment and Plan of Treatment: Continue on Bowel regimen, Improved ostomy output , continue senna and miralax prn,  and please follow-up as an outpatient with your primary care provider for further care and recommendations.     PRINCIPAL DISCHARGE DIAGNOSIS  Diagnosis: Pyelonephritis  Assessment and Plan of Treatment: You presented with fever, and back pain. Your urinalysis was positive for infection.You completed IV antibiotic course. Your symptoms improved, and your urine culture and blood cultures were negative for any growth.  Monitor for signs/symptoms of infection, such as, fever/chills, burning/pain with urination, urinary frequency/hesitancy, cloudy urine, or blood in urine. Please follow up with your primary care physician regarding your hospitalization.        SECONDARY DISCHARGE DIAGNOSES  Diagnosis: MDD (major depressive disorder)  Assessment and Plan of Treatment: Continue your medication Duloxetine 60 mg,  please follow-up as an outpatient with your primary care provider for further care and recommendations. Please follow up with your primary care and psychiatrist within in 1-2 weeks for further medical management.  Call your psychiatrist immediately if you feel suicidal or if you need to talk to someone.  Exercise 30 minutes daily as tolerated.With the goal to increase understanding of depressive feelings, and to alleviate depressed mood and return to previous level of normal functioning.    Diagnosis: HTN (hypertension)  Assessment and Plan of Treatment: Continue blood pressure medication regimen as directed. Monitor for any visual changes, headaches or dizziness.  Monitor blood pressure regularly.   Follow up with your Primary Care Physician  for further management for high blood pressure.    Diagnosis: SALLIE (obstructive sleep apnea)  Assessment and Plan of Treatment: Continue using CPAP at bedtime    Diagnosis: Chronic back pain  Assessment and Plan of Treatment: Pain control improved continue with Gabapentin, Toradol, Baclofen and Cyclobenzaprine. Physical Therapy evaluated you and  recommended Sub Acute Rehab. Please follow up with your primary care physician regarding your hospitalization in 1 week following discharge.      Diagnosis: Chronic combined systolic and diastolic heart failure  Assessment and Plan of Treatment: You have a history of having combined systolic/diastolic heart failure. Continue recommended medication regimen Lasix, and Metoprolol Tartrate . Follow up with Dr. Leung your cardiologist to confirm your medication management, and overall heart failure management. You had an echocardiogram with preserved heart function. Monitor for signs/symptoms of fluid overload and electrolyte abnormalities, such as, shortness of breath, cough, swelling, chest discomfort, changes in heart rate, dizziness, fainting, or changes in mental status. Continue monitor daily weights, follow 1500 cc fluid restriction, along with sodium restriction.  Follow heart healthy diet. If you develop severe lower extremity swelling and shortness of breath please seek medial attention. Please call and schediule outpatient follow-up with your Primary Care Physician and Cardiologist within 1 week once you've been discharged from the hospital.    Diagnosis: DM (diabetes mellitus)  Assessment and Plan of Treatment: Your A1c was 7.9  Continue your medication regimen and a consistent carbohydrate diet (Meaning eating the same amount of carbohydrates at the same time each day). Monitor blood glucose levels throughout the day before meals and at bedtime. Record blood sugars and bring to outpatient providers appointment in order to be reviewed by your doctor for management modifications. If your sugars are more than 400 or less than 70 you should contact your PCP immediately. Monitor for signs/symptoms of low blood glucose, such as, dizziness, altered mental status, or cool/clammy skin. In addition, monitor for signs/symptoms of high blood glucose, such as, feeling hot, dry, fatigued, or with increased thirst/urination. Make regular podiatry appointments in order to have feet checked for wounds and uncontrolled toe nail growth to prevent infections, as well as, appointments with an ophthalmologist to monitor your vision.      Diagnosis: Anemia  Assessment and Plan of Treatment: Your blood count has been stable. Please follow up with your primary care physician for further management and monitoring of your anemia. Monitor for signs/symptoms indicating worsening of disease, such as, easy bleeding/bruising, pale skin, fatigue, dizziness, increased heart rate, or chest pain.      Diagnosis: Constipation  Assessment and Plan of Treatment: Continue on Bowel regimen, Improved ostomy output , continue senna and miralax prn,  and please follow-up as an outpatient with your primary care provider for further care and recommendations.

## 2022-10-05 NOTE — PROGRESS NOTE ADULT - PROBLEM SELECTOR PLAN 9
- Patient with SALLIE on CPAP @ night - does not know settings  - CPAP nightly
- Patient with SALLIE on CPAP @ night - does not know settings  - Will initiate CPAP at night

## 2022-10-05 NOTE — PROGRESS NOTE ADULT - SUBJECTIVE AND OBJECTIVE BOX
Merlin Mathew, MD   Hospitalist  Pager #[phone]    PROGRESS NOTE:     Patient is a 47y old  Male who presents with a chief complaint of Back pain, flank pain, fever (30 Sep 2022 09:26)      SUBJECTIVE / OVERNIGHT EVENTS: NEON   Patient feels improved, back pain is better though not resolved. Denies any F/C.   Is at rehab, goal is to return home when HHA re-instated.   ADDITIONAL REVIEW OF SYSTEMS:    MEDICATIONS  (STANDING):  baclofen 5 milliGRAM(s) Oral every 8 hours  cholecalciferol 1000 Unit(s) Oral daily  dextrose 5%. 1000 milliLiter(s) (50 mL/Hr) IV Continuous <Continuous>  dextrose 5%. 1000 milliLiter(s) (100 mL/Hr) IV Continuous <Continuous>  dextrose 50% Injectable 25 Gram(s) IV Push once  dextrose 50% Injectable 12.5 Gram(s) IV Push once  dextrose 50% Injectable 25 Gram(s) IV Push once  diVALproex  milliGRAM(s) Oral every 12 hours  DULoxetine 60 milliGRAM(s) Oral daily  enoxaparin Injectable 40 milliGRAM(s) SubCutaneous every 24 hours  ertapenem  IVPB 1000 milliGRAM(s) IV Intermittent every 24 hours  furosemide    Tablet 80 milliGRAM(s) Oral daily  gabapentin 400 milliGRAM(s) Oral every 8 hours  glucagon  Injectable 1 milliGRAM(s) IntraMuscular once  insulin glargine Injectable (LANTUS) 30 Unit(s) SubCutaneous at bedtime  insulin lispro (ADMELOG) corrective regimen sliding scale   SubCutaneous three times a day before meals  insulin lispro (ADMELOG) corrective regimen sliding scale   SubCutaneous at bedtime  melatonin 6 milliGRAM(s) Oral at bedtime  metoprolol tartrate 25 milliGRAM(s) Oral two times a day  senna 2 Tablet(s) Oral at bedtime  traMADol 50 milliGRAM(s) Oral every 8 hours    MEDICATIONS  (PRN):  acetaminophen     Tablet .. 650 milliGRAM(s) Oral every 6 hours PRN Temp greater or equal to 38C (100.4F), Mild Pain (1 - 3)  aluminum hydroxide/magnesium hydroxide/simethicone Suspension 30 milliLiter(s) Oral every 4 hours PRN Dyspepsia  cyclobenzaprine 10 milliGRAM(s) Oral two times a day PRN Muscle Spasm  dextrose Oral Gel 15 Gram(s) Oral once PRN Blood Glucose LESS THAN 70 milliGRAM(s)/deciliter  oxyCODONE    IR 30 milliGRAM(s) Oral every 6 hours PRN Moderate Pain (4 - 6)      CAPILLARY BLOOD GLUCOSE      POCT Blood Glucose.: 220 mg/dL (01 Oct 2022 11:55)  POCT Blood Glucose.: 239 mg/dL (01 Oct 2022 07:36)  POCT Blood Glucose.: 132 mg/dL (30 Sep 2022 21:07)  POCT Blood Glucose.: 100 mg/dL (30 Sep 2022 18:06)  POCT Blood Glucose.: 114 mg/dL (30 Sep 2022 15:20)  POCT Blood Glucose.: 84 mg/dL (30 Sep 2022 14:24)  POCT Blood Glucose.: 89 mg/dL (30 Sep 2022 13:35)    I&O's Summary      PHYSICAL EXAM:  Vital Signs Last 24 Hrs  T(C): 37.8 (01 Oct 2022 12:01), Max: 37.8 (01 Oct 2022 12:01)  T(F): 100.1 (01 Oct 2022 12:01), Max: 100.1 (01 Oct 2022 12:01)  HR: 91 (01 Oct 2022 12:01) (78 - 91)  BP: 106/51 (01 Oct 2022 12:01) (101/56 - 139/73)  BP(mean): --  RR: 18 (01 Oct 2022 12:01) (16 - 18)  SpO2: 100% (01 Oct 2022 12:01) (96% - 100%)    Parameters below as of 01 Oct 2022 12:01  Patient On (Oxygen Delivery Method): nasal cannula  O2 Flow (L/min): 2      CONSTITUTIONAL: NAD, well-developed obese male   RESPIRATORY: Normal respiratory effort; lungs are clear to auscultation bilaterally  CARDIOVASCULAR: Regular rate and rhythm, normal S1 and S2, no murmur/rub/gallop; LE erythematous, no TTP, no warmth  ABDOMEN: Nontender to palpation, normoactive bowel sounds, no rebound/guarding; No hepatosplenomegaly  MUSCULOSKELETAL no clubbing or cyanosis of digits; no joint swelling or tenderness to palpation  PSYCH: A+O to person, place, and time; affect appropriate    LABS:                        8.5    3.67  )-----------( 153      ( 01 Oct 2022 06:45 )             29.6     10    132<L>  |  98  |  21  ----------------------------<  240<H>  4.2   |  25  |  0.72    Ca    8.1<L>      01 Oct 2022 06:45    TPro  7.2  /  Alb  3.0<L>  /  TBili  0.6  /  DBili  x   /  AST  15  /  ALT  5   /  AlkPhos  102  1001    PT/INR - ( 30 Sep 2022 03:00 )   PT: 16.3 sec;   INR: 1.40 ratio         PTT - ( 30 Sep 2022 03:00 )  PTT:36.6 sec      Urinalysis Basic - ( 30 Sep 2022 05:42 )    Color: Yellow / Appearance: Turbid / S.019 / pH: x  Gluc: x / Ketone: Negative  / Bili: Negative / Urobili: <2 mg/dL   Blood: x / Protein: 100 mg/dL / Nitrite: Positive   Leuk Esterase: Large / RBC: 4 /HPF / WBC 22 /HPF   Sq Epi: x / Non Sq Epi: 3 /HPF / Bacteria: Moderate        Culture - Blood (collected 30 Sep 2022 03:00)  Source: .Blood Blood-Peripheral  Preliminary Report (01 Oct 2022 07:01):    No growth to date.    Culture - Blood (collected 30 Sep 2022 02:45)  Source: .Blood Blood-Peripheral  Preliminary Report (01 Oct 2022 07:01):    No growth to date.        RADIOLOGY & ADDITIONAL TESTS:  Results Reviewed:   Imaging Personally Reviewed:  Electrocardiogram Personally Reviewed:    COORDINATION OF CARE:  Care Discussed with Consultants/Other Providers [Y/N]:  Prior or Outpatient Records Reviewed [Y/N]:  
Patient is a 47y old  Male who presents with a chief complaint of Back pain, flank pain, fever (01 Oct 2022 13:15)    SUBJECTIVE / OVERNIGHT EVENTS:   NAEO  vss  Denies any fever/chills, n/v, no sob. Chest pressure hasn't recurred. Low back pain is improved and manageable.   urinating well. no dysuria. repeat urine culture with no growth.   ostomy with normal output.     MEDICATIONS  (STANDING):  cholecalciferol 1000 Unit(s) Oral daily  dextrose 5%. 1000 milliLiter(s) (50 mL/Hr) IV Continuous <Continuous>  dextrose 5%. 1000 milliLiter(s) (100 mL/Hr) IV Continuous <Continuous>  dextrose 50% Injectable 25 Gram(s) IV Push once  dextrose 50% Injectable 12.5 Gram(s) IV Push once  dextrose 50% Injectable 25 Gram(s) IV Push once  diVALproex  milliGRAM(s) Oral every 12 hours  DULoxetine 60 milliGRAM(s) Oral daily  enoxaparin Injectable 40 milliGRAM(s) SubCutaneous every 24 hours  ertapenem  IVPB 1000 milliGRAM(s) IV Intermittent every 24 hours  ferrous    sulfate 325 milliGRAM(s) Oral <User Schedule>  furosemide    Tablet 80 milliGRAM(s) Oral daily  gabapentin 400 milliGRAM(s) Oral every 8 hours  glucagon  Injectable 1 milliGRAM(s) IntraMuscular once  influenza   Vaccine 0.5 milliLiter(s) IntraMuscular once  insulin glargine Injectable (LANTUS) 50 Unit(s) SubCutaneous at bedtime  insulin lispro (ADMELOG) corrective regimen sliding scale   SubCutaneous three times a day before meals  insulin lispro (ADMELOG) corrective regimen sliding scale   SubCutaneous at bedtime  melatonin 6 milliGRAM(s) Oral at bedtime  metoprolol tartrate 25 milliGRAM(s) Oral two times a day  senna 2 Tablet(s) Oral two times a day  traMADol 50 milliGRAM(s) Oral every 8 hours    MEDICATIONS  (PRN):  acetaminophen     Tablet .. 650 milliGRAM(s) Oral every 6 hours PRN Temp greater or equal to 38C (100.4F), Mild Pain (1 - 3)  aluminum hydroxide/magnesium hydroxide/simethicone Suspension 30 milliLiter(s) Oral every 4 hours PRN Dyspepsia  baclofen 20 milliGRAM(s) Oral every 6 hours PRN Muscle Spasm  cyclobenzaprine 10 milliGRAM(s) Oral two times a day PRN Muscle Spasm  dextrose Oral Gel 15 Gram(s) Oral once PRN Blood Glucose LESS THAN 70 milliGRAM(s)/deciliter  oxyCODONE    IR 30 milliGRAM(s) Oral every 6 hours PRN Moderate Pain (4 - 6)  polyethylene glycol 3350 17 Gram(s) Oral daily PRN Constipation      PHYSICAL EXAM:   Vital Signs Last 24 Hrs  T(C): 36.8 (05 Oct 2022 18:48), Max: 36.9 (05 Oct 2022 15:15)  T(F): 98.2 (05 Oct 2022 18:48), Max: 98.5 (05 Oct 2022 15:15)  HR: 66 (05 Oct 2022 18:48) (55 - 69)  BP: 92/41 (05 Oct 2022 18:48) (92/41 - 138/68)  BP(mean): 74 (05 Oct 2022 09:00) (74 - 74)    RR: 18 (05 Oct 2022 18:48) (14 - 18)  SpO2: 100% (05 Oct 2022 18:48) (96% - 100%)    O2 Parameters below as of 05 Oct 2022 18:48  Patient On (Oxygen Delivery Method): nasal cannula  O2 Flow (L/min): 2        PHYSICAL EXAM:  CONSTITUTIONAL: NAD, well-developed obese male   RESPIRATORY: Normal respiratory effort; lungs are clear to auscultation bilaterally  CARDIOVASCULAR: Regular rate and rhythm, normal S1 and S2, no murmur/rub/gallop; LE erythematous, no TTP, no warmth,  trace edema  ABDOMEN: Nontender to palpation, normoactive bowel sounds, no rebound/guarding; No hepatosplenomegaly  MUSCULOSKELETAL no clubbing or cyanosis of digits; no joint swelling or tenderness to palpation  PSYCH: A+O to person, place, and time; affect appropriate    LABS:                         8.0    3.45  )-----------( 196      ( 05 Oct 2022 07:00 )             28.2     10-05    133<L>  |  97<L>  |  14  ----------------------------<  240<H>  4.2   |  29  |  0.57    Ca    8.0<L>      05 Oct 2022 07:00  Phos  3.5     10-05  Mg     1.90     10-05    CAPILLARY BLOOD GLUCOSE      POCT Blood Glucose.: 254 mg/dL (05 Oct 2022 16:56)      RADIOLOGY & ADDITIONAL TESTS: Reviewed.        POCT Blood Glucose.: 238 mg/dL (04 Oct 2022 11:57)  POCT Blood Glucose.: 196 mg/dL (04 Oct 2022 08:30)  POCT Blood Glucose.: 193 mg/dL (03 Oct 2022 22:20)  POCT Blood Glucose.: 146 mg/dL (03 Oct 2022 17:23)      RADIOLOGY & ADDITIONAL TESTS: Reviewed.            Culture - Urine (collected 30 Sep 2022 06:49)  Source: Catheterized Catheterized  Final Report (01 Oct 2022 15:01):    >=3 organisms. Probable collection contamination.    Culture - Blood (collected 30 Sep 2022 03:00)  Source: .Blood Blood-Peripheral  Preliminary Report (01 Oct 2022 07:01):    No growth to date.    Culture - Blood (collected 30 Sep 2022 02:45)  Source: .Blood Blood-Peripheral  Preliminary Report (01 Oct 2022 07:01):    No growth to date.        RADIOLOGY & ADDITIONAL TESTS:  Results Reviewed:   Imaging Personally Reviewed:  Electrocardiogram Personally Reviewed:    COORDINATION OF CARE:  Care Discussed with Consultants/Other Providers [Y/N]:  Prior or Outpatient Records Reviewed [Y/N]:  
Merlin Mathew, MD   Hospitalist  Pager #[phone]    PROGRESS NOTE:     Patient is a 47y old  Male who presents with a chief complaint of Back pain, flank pain, fever (01 Oct 2022 13:15)      SUBJECTIVE / OVERNIGHT EVENTS: Patient is having some back spasms otherwise feels well   Reports he used CPAP overnight   Good PO intake     ADDITIONAL REVIEW OF SYSTEMS:    MEDICATIONS  (STANDING):  baclofen 5 milliGRAM(s) Oral every 8 hours  cholecalciferol 1000 Unit(s) Oral daily  dextrose 5%. 1000 milliLiter(s) (50 mL/Hr) IV Continuous <Continuous>  dextrose 5%. 1000 milliLiter(s) (100 mL/Hr) IV Continuous <Continuous>  dextrose 50% Injectable 25 Gram(s) IV Push once  dextrose 50% Injectable 12.5 Gram(s) IV Push once  dextrose 50% Injectable 25 Gram(s) IV Push once  diVALproex  milliGRAM(s) Oral every 12 hours  DULoxetine 60 milliGRAM(s) Oral daily  enoxaparin Injectable 40 milliGRAM(s) SubCutaneous every 24 hours  ertapenem  IVPB 1000 milliGRAM(s) IV Intermittent every 24 hours  ferrous    sulfate 325 milliGRAM(s) Oral <User Schedule>  furosemide    Tablet 80 milliGRAM(s) Oral daily  gabapentin 400 milliGRAM(s) Oral every 8 hours  glucagon  Injectable 1 milliGRAM(s) IntraMuscular once  insulin glargine Injectable (LANTUS) 50 Unit(s) SubCutaneous at bedtime  insulin lispro (ADMELOG) corrective regimen sliding scale   SubCutaneous three times a day before meals  insulin lispro (ADMELOG) corrective regimen sliding scale   SubCutaneous at bedtime  melatonin 6 milliGRAM(s) Oral at bedtime  metoprolol tartrate 25 milliGRAM(s) Oral two times a day  senna 2 Tablet(s) Oral at bedtime  traMADol 50 milliGRAM(s) Oral every 8 hours    MEDICATIONS  (PRN):  acetaminophen     Tablet .. 650 milliGRAM(s) Oral every 6 hours PRN Temp greater or equal to 38C (100.4F), Mild Pain (1 - 3)  aluminum hydroxide/magnesium hydroxide/simethicone Suspension 30 milliLiter(s) Oral every 4 hours PRN Dyspepsia  cyclobenzaprine 10 milliGRAM(s) Oral two times a day PRN Muscle Spasm  dextrose Oral Gel 15 Gram(s) Oral once PRN Blood Glucose LESS THAN 70 milliGRAM(s)/deciliter  oxyCODONE    IR 30 milliGRAM(s) Oral every 6 hours PRN Moderate Pain (4 - 6)      CAPILLARY BLOOD GLUCOSE  201 (02 Oct 2022 07:29)      POCT Blood Glucose.: 195 mg/dL (02 Oct 2022 11:56)  POCT Blood Glucose.: 201 mg/dL (02 Oct 2022 07:29)  POCT Blood Glucose.: 215 mg/dL (01 Oct 2022 21:39)  POCT Blood Glucose.: 173 mg/dL (01 Oct 2022 16:59)    I&O's Summary    01 Oct 2022 07:01  -  02 Oct 2022 07:00  --------------------------------------------------------  IN: 240 mL / OUT: 2400 mL / NET: -2160 mL        PHYSICAL EXAM:  Vital Signs Last 24 Hrs  T(C): 36.9 (02 Oct 2022 12:00), Max: 37 (01 Oct 2022 14:01)  T(F): 98.4 (02 Oct 2022 12:00), Max: 98.6 (01 Oct 2022 14:01)  HR: 68 (02 Oct 2022 12:00) (65 - 88)  BP: 111/54 (02 Oct 2022 12:00) (111/51 - 136/78)  BP(mean): --  RR: 15 (02 Oct 2022 12:00) (15 - 18)  SpO2: 97% (02 Oct 2022 12:00) (96% - 100%)    Parameters below as of 02 Oct 2022 12:00  Patient On (Oxygen Delivery Method): room air      CONSTITUTIONAL: NAD, well-developed obese male   RESPIRATORY: Normal respiratory effort; lungs are clear to auscultation bilaterally  CARDIOVASCULAR: Regular rate and rhythm, normal S1 and S2, no murmur/rub/gallop; LE erythematous, no TTP, no warmth  ABDOMEN: Nontender to palpation, normoactive bowel sounds, no rebound/guarding; No hepatosplenomegaly  MUSCULOSKELETAL no clubbing or cyanosis of digits; no joint swelling or tenderness to palpation  PSYCH: A+O to person, place, and time; affect appropriate  LABS:                        8.1    4.59  )-----------( 159      ( 02 Oct 2022 07:07 )             28.2     10-02    134<L>  |  100  |  16  ----------------------------<  173<H>  4.3   |  25  |  0.60    Ca    8.0<L>      02 Oct 2022 07:07  Phos  3.9     10-02  Mg     2.10     10-02    TPro  7.0  /  Alb  3.0<L>  /  TBili  0.4  /  DBili  x   /  AST  13  /  ALT  6   /  AlkPhos  97  10-02              Culture - Urine (collected 30 Sep 2022 06:49)  Source: Catheterized Catheterized  Final Report (01 Oct 2022 15:01):    >=3 organisms. Probable collection contamination.    Culture - Urine (collected 30 Sep 2022 05:43)  Source: Clean Catch Clean Catch (Midstream)  Final Report (01 Oct 2022 15:17):    >=3 organisms. Probable collection contamination.    Culture - Blood (collected 30 Sep 2022 03:00)  Source: .Blood Blood-Peripheral  Preliminary Report (01 Oct 2022 07:01):    No growth to date.    Culture - Blood (collected 30 Sep 2022 02:45)  Source: .Blood Blood-Peripheral  Preliminary Report (01 Oct 2022 07:01):    No growth to date.        RADIOLOGY & ADDITIONAL TESTS:  Results Reviewed:   Imaging Personally Reviewed:  Electrocardiogram Personally Reviewed:    COORDINATION OF CARE:  Care Discussed with Consultants/Other Providers [Y/N]:  Prior or Outpatient Records Reviewed [Y/N]:  
Patient is a 47y old  Male who presents with a chief complaint of Back pain, flank pain, fever (01 Oct 2022 13:15)    SUBJECTIVE / OVERNIGHT EVENTS:   Overnight, noted to have HR in 130-140s, ekg with sinus tach. pt reported palpitations but denied any chest pain. patient stated it might have been due to not having been taking baclofen at a a right dose. he was on 20mg QID at a facility. otherwise afebrile, Bp Stable. was on CPAP overnight.   tachycardia resolved this morning. denies any fever/chills, no sob/cp. no abdn pain. reports feeling constipated and reports low output from the ostomy.   reports chronic muscle spasms.       MEDICATIONS  (STANDING):  cholecalciferol 1000 Unit(s) Oral daily  dextrose 5%. 1000 milliLiter(s) (50 mL/Hr) IV Continuous <Continuous>  dextrose 5%. 1000 milliLiter(s) (100 mL/Hr) IV Continuous <Continuous>  dextrose 50% Injectable 25 Gram(s) IV Push once  dextrose 50% Injectable 12.5 Gram(s) IV Push once  dextrose 50% Injectable 25 Gram(s) IV Push once  diVALproex  milliGRAM(s) Oral every 12 hours  DULoxetine 60 milliGRAM(s) Oral daily  enoxaparin Injectable 40 milliGRAM(s) SubCutaneous every 24 hours  ertapenem  IVPB 1000 milliGRAM(s) IV Intermittent every 24 hours  ferrous    sulfate 325 milliGRAM(s) Oral <User Schedule>  furosemide    Tablet 80 milliGRAM(s) Oral daily  gabapentin 400 milliGRAM(s) Oral every 8 hours  glucagon  Injectable 1 milliGRAM(s) IntraMuscular once  influenza   Vaccine 0.5 milliLiter(s) IntraMuscular once  insulin glargine Injectable (LANTUS) 50 Unit(s) SubCutaneous at bedtime  insulin lispro (ADMELOG) corrective regimen sliding scale   SubCutaneous three times a day before meals  insulin lispro (ADMELOG) corrective regimen sliding scale   SubCutaneous at bedtime  melatonin 6 milliGRAM(s) Oral at bedtime  metoprolol tartrate 25 milliGRAM(s) Oral two times a day  senna 2 Tablet(s) Oral two times a day  traMADol 50 milliGRAM(s) Oral every 8 hours    MEDICATIONS  (PRN):  acetaminophen     Tablet .. 650 milliGRAM(s) Oral every 6 hours PRN Temp greater or equal to 38C (100.4F), Mild Pain (1 - 3)  aluminum hydroxide/magnesium hydroxide/simethicone Suspension 30 milliLiter(s) Oral every 4 hours PRN Dyspepsia  baclofen 20 milliGRAM(s) Oral every 6 hours PRN Muscle Spasm  cyclobenzaprine 10 milliGRAM(s) Oral two times a day PRN Muscle Spasm  dextrose Oral Gel 15 Gram(s) Oral once PRN Blood Glucose LESS THAN 70 milliGRAM(s)/deciliter  oxyCODONE    IR 30 milliGRAM(s) Oral every 6 hours PRN Moderate Pain (4 - 6)    I&O's Summary  02 Oct 2022 07:01  -  03 Oct 2022 07:00  --------------------------------------------------------  IN: 600 mL / OUT: 2200 mL / NET: -1600 mL    03 Oct 2022 07:01  -  03 Oct 2022 13:28  --------------------------------------------------------  IN: 0 mL / OUT: 900 mL / NET: -900 mL    PHYSICAL EXAM:  VITAL SIGNS:  T(C): 36.7 (10-03-22 @ 09:50), Max: 37.6 (10-03-22 @ 03:51)  T(F): 98.1 (10-03-22 @ 09:50), Max: 99.6 (10-03-22 @ 03:51)  HR: 61 (10-03-22 @ 11:08) (61 - 73)  BP: 112/58 (10-03-22 @ 09:50) (112/58 - 126/56)  BP(mean): --  RR: 16 (10-03-22 @ 11:08) (16 - 17)  SpO2: 97% (10-03-22 @ 11:08) (96% - 100%)  Wt(kg): --    PHYSICAL EXAM:  CONSTITUTIONAL: NAD, well-developed obese male   RESPIRATORY: Normal respiratory effort; lungs are clear to auscultation bilaterally  CARDIOVASCULAR: Regular rate and rhythm, normal S1 and S2, no murmur/rub/gallop; LE erythematous, no TTP, no warmth,  trace edema  ABDOMEN: Nontender to palpation, normoactive bowel sounds, no rebound/guarding; No hepatosplenomegaly  MUSCULOSKELETAL no clubbing or cyanosis of digits; no joint swelling or tenderness to palpation  PSYCH: A+O to person, place, and time; affect appropriate    LABS:                   8.2    4.14  )-----------( 163      ( 03 Oct 2022 07:03 )             28.7     10-03    133<L>  |  99  |  13  ----------------------------<  154<H>  4.9   |  27  |  0.76    Ca    8.3<L>      03 Oct 2022 07:03  Phos  3.6     10-03  Mg     2.00     10-03    TPro  7.0  /  Alb  2.9<L>  /  TBili  0.4  /  DBili  x   /  AST  15  /  ALT  8   /  AlkPhos  104  10-03    CAPILLARY BLOOD GLUCOSE  POCT Blood Glucose.: 165 mg/dL (03 Oct 2022 12:01)  POCT Blood Glucose.: 150 mg/dL (03 Oct 2022 08:32)  POCT Blood Glucose.: 198 mg/dL (02 Oct 2022 21:14)  POCT Blood Glucose.: 167 mg/dL (02 Oct 2022 17:19)    POCT Blood Glucose.: 165 mg/dL (03 Oct 2022 12:01)      RADIOLOGY & ADDITIONAL TESTS: Reviewed.     Culture - Urine (collected 30 Sep 2022 06:49)  Source: Catheterized Catheterized  Final Report (01 Oct 2022 15:01):    >=3 organisms. Probable collection contamination.    Culture - Urine (collected 30 Sep 2022 05:43)  Source: Clean Catch Clean Catch (Midstream)  Final Report (01 Oct 2022 15:17):    >=3 organisms. Probable collection contamination.    Culture - Blood (collected 30 Sep 2022 03:00)  Source: .Blood Blood-Peripheral  Preliminary Report (01 Oct 2022 07:01):    No growth to date.    Culture - Blood (collected 30 Sep 2022 02:45)  Source: .Blood Blood-Peripheral  Preliminary Report (01 Oct 2022 07:01):    No growth to date.        RADIOLOGY & ADDITIONAL TESTS:  Results Reviewed:   Imaging Personally Reviewed:  Electrocardiogram Personally Reviewed:    COORDINATION OF CARE:  Care Discussed with Consultants/Other Providers [Y/N]:  Prior or Outpatient Records Reviewed [Y/N]:  
Patient is a 47y old  Male who presents with a chief complaint of Back pain, flank pain, fever (01 Oct 2022 13:15)    SUBJECTIVE / OVERNIGHT EVENTS:   NAEO  vss  No further episodes of tachycardia overnight. denies any chest pain, no palpitations. reports feeling well overall. back pain is manageable. denies any fever/chills, no cough. no sob. tolerating po.       MEDICATIONS  (STANDING):  cholecalciferol 1000 Unit(s) Oral daily  dextrose 5%. 1000 milliLiter(s) (50 mL/Hr) IV Continuous <Continuous>  dextrose 5%. 1000 milliLiter(s) (100 mL/Hr) IV Continuous <Continuous>  dextrose 50% Injectable 25 Gram(s) IV Push once  dextrose 50% Injectable 12.5 Gram(s) IV Push once  dextrose 50% Injectable 25 Gram(s) IV Push once  diVALproex  milliGRAM(s) Oral every 12 hours  DULoxetine 60 milliGRAM(s) Oral daily  enoxaparin Injectable 40 milliGRAM(s) SubCutaneous every 24 hours  ertapenem  IVPB 1000 milliGRAM(s) IV Intermittent every 24 hours  ferrous    sulfate 325 milliGRAM(s) Oral <User Schedule>  furosemide    Tablet 80 milliGRAM(s) Oral daily  gabapentin 400 milliGRAM(s) Oral every 8 hours  glucagon  Injectable 1 milliGRAM(s) IntraMuscular once  influenza   Vaccine 0.5 milliLiter(s) IntraMuscular once  insulin glargine Injectable (LANTUS) 50 Unit(s) SubCutaneous at bedtime  insulin lispro (ADMELOG) corrective regimen sliding scale   SubCutaneous three times a day before meals  insulin lispro (ADMELOG) corrective regimen sliding scale   SubCutaneous at bedtime  melatonin 6 milliGRAM(s) Oral at bedtime  metoprolol tartrate 25 milliGRAM(s) Oral two times a day  senna 2 Tablet(s) Oral two times a day  traMADol 50 milliGRAM(s) Oral every 8 hours    MEDICATIONS  (PRN):  acetaminophen     Tablet .. 650 milliGRAM(s) Oral every 6 hours PRN Temp greater or equal to 38C (100.4F), Mild Pain (1 - 3)  aluminum hydroxide/magnesium hydroxide/simethicone Suspension 30 milliLiter(s) Oral every 4 hours PRN Dyspepsia  baclofen 20 milliGRAM(s) Oral every 6 hours PRN Muscle Spasm  cyclobenzaprine 10 milliGRAM(s) Oral two times a day PRN Muscle Spasm  dextrose Oral Gel 15 Gram(s) Oral once PRN Blood Glucose LESS THAN 70 milliGRAM(s)/deciliter  oxyCODONE    IR 30 milliGRAM(s) Oral every 6 hours PRN Moderate Pain (4 - 6)  polyethylene glycol 3350 17 Gram(s) Oral daily PRN Constipation    I&O's Summary    03 Oct 2022 07:01  -  04 Oct 2022 07:00  --------------------------------------------------------  IN: 0 mL / OUT: 3800 mL / NET: -3800 mL    PHYSICAL EXAM:  Vital Signs Last 24 Hrs  T(C): 36.7 (04 Oct 2022 09:52), Max: 37.2 (03 Oct 2022 22:15)  T(F): 98 (04 Oct 2022 09:52), Max: 98.9 (03 Oct 2022 22:15)  HR: 67 (04 Oct 2022 11:40) (61 - 69)  BP: 120/55 (04 Oct 2022 09:52) (104/50 - 126/64)  BP(mean): 74 (04 Oct 2022 09:52) (74 - 74)  RR: 18 (04 Oct 2022 10:05) (17 - 18)  SpO2: 98% (04 Oct 2022 11:40) (88% - 100%)    Parameters below as of 04 Oct 2022 10:05  Patient On (Oxygen Delivery Method): nasal cannula    PHYSICAL EXAM:  CONSTITUTIONAL: NAD, well-developed obese male   RESPIRATORY: Normal respiratory effort; lungs are clear to auscultation bilaterally  CARDIOVASCULAR: Regular rate and rhythm, normal S1 and S2, no murmur/rub/gallop; LE erythematous, no TTP, no warmth,  trace edema  ABDOMEN: Nontender to palpation, normoactive bowel sounds, no rebound/guarding; No hepatosplenomegaly  MUSCULOSKELETAL no clubbing or cyanosis of digits; no joint swelling or tenderness to palpation  PSYCH: A+O to person, place, and time; affect appropriate    LABS:                       8.2    4.14  )-----------( 163      ( 03 Oct 2022 07:03 )             28.7     10-03    133<L>  |  99  |  13  ----------------------------<  154<H>  4.9   |  27  |  0.76    Ca    8.3<L>      03 Oct 2022 07:03  Phos  3.6     10-03  Mg     2.00     10-03    TPro  7.0  /  Alb  2.9<L>  /  TBili  0.4  /  DBili  x   /  AST  15  /  ALT  8   /  AlkPhos  104  10-03    CAPILLARY BLOOD GLUCOSE    POCT Blood Glucose.: 238 mg/dL (04 Oct 2022 11:57)  POCT Blood Glucose.: 196 mg/dL (04 Oct 2022 08:30)  POCT Blood Glucose.: 193 mg/dL (03 Oct 2022 22:20)  POCT Blood Glucose.: 146 mg/dL (03 Oct 2022 17:23)      RADIOLOGY & ADDITIONAL TESTS: Reviewed.            RADIOLOGY & ADDITIONAL TESTS: Reviewed.     Culture - Urine (collected 30 Sep 2022 06:49)  Source: Catheterized Catheterized  Final Report (01 Oct 2022 15:01):    >=3 organisms. Probable collection contamination.    Culture - Blood (collected 30 Sep 2022 03:00)  Source: .Blood Blood-Peripheral  Preliminary Report (01 Oct 2022 07:01):    No growth to date.    Culture - Blood (collected 30 Sep 2022 02:45)  Source: .Blood Blood-Peripheral  Preliminary Report (01 Oct 2022 07:01):    No growth to date.        RADIOLOGY & ADDITIONAL TESTS:  Results Reviewed:   Imaging Personally Reviewed:  Electrocardiogram Personally Reviewed:    COORDINATION OF CARE:  Care Discussed with Consultants/Other Providers [Y/N]:  Prior or Outpatient Records Reviewed [Y/N]:

## 2022-10-05 NOTE — PROGRESS NOTE ADULT - PROBLEM SELECTOR PLAN 1
Patient with positive u/a, fever, back pain   - UA+ for leuk esterase, nitrite   - Ucx 3+ organisms, possible contamination.   - Clinically improving, repeat urine culture with no growth.   - c/w Tylenol PRN pain and/or fever  - blood culture ngtd  - c/w Ertapenem 1g q24hr with plan for 7-10 day course for now (likely 9/30-10/6)  - trend WBC count

## 2022-10-05 NOTE — PROGRESS NOTE ADULT - PROBLEM SELECTOR PROBLEM 9
SALLIE (obstructive sleep apnea)

## 2022-10-05 NOTE — PROGRESS NOTE ADULT - ASSESSMENT
48 y/o male with PMH paraplegia, opioid dependence and withdrawal, GERD, neuromuscular dysfunction of bladder with chronic indwelling Medina DM2, MDD, diastolic and systolic CHF, SALLIE, HTN, ileostomy, sacral ulcer presenting [facility] for fever and back pain and flank pain admitted to medicine for the management of acute uti and back pain.    
46 y/o male with PMH paraplegia, opioid dependence and withdrawal, GERD, neuromuscular dysfunction of bladder with chronic indwelling Medina DM2, MDD, diastolic and systolic CHF, SALLIE, HTN, ileostomy, sacral ulcer presenting [facility] for fever and back pain and flank pain admitted to medicine for the management of acute uti and back pain.    
46 y/o male with PMH paraplegia, opioid dependence and withdrawal, GERD, neuromuscular dysfunction of bladder with chronic indwelling Medina DM2, MDD, diastolic and systolic CHF, SALLIE, HTN, ileostomy, sacral ulcer presenting [facility] [place] fever and back pain and flank pain admitted to medicine for the management of acute uti and back pain.    
46 y/o male with PMH paraplegia, opioid dependence and withdrawal, GERD, neuromuscular dysfunction of bladder with chronic indwelling Medina DM2, MDD, diastolic and systolic CHF, SALLIE, HTN, ileostomy, sacral ulcer presenting [facility] for fever and back pain and flank pain admitted to medicine for the management of acute uti and back pain.    
46 y/o male with PMH paraplegia, opioid dependence and withdrawal, GERD, neuromuscular dysfunction of bladder with chronic indwelling Medina DM2, MDD, diastolic and systolic CHF, SALLIE, HTN, ileostomy, sacral ulcer presenting [facility] fever and back pain and flank pain admitted to medicine for the management of acute uti and back pain.

## 2022-10-05 NOTE — PROGRESS NOTE ADULT - PROBLEM SELECTOR PLAN 6
- home medications: Lantus 100 units BID, Novolog 10 units TID pre meal. Patient's BS low in the ED yesterday   - A1C 7.9   - Increase Lantus to 50 Qhs + SSI for now, when PO intake improves, will up-titrate Insulin   - FS AC & HS.  - Diet: Consistent Carbs.  - f/u AM HbA1c.
- home medications: Lantus 100 units BID, Novolog 10 units TID pre meal.   - A1C 7.9   - cw Lantus to 50 Qhs + SSI for now, when PO intake improves, will up-titrate Insulin   - FS AC & HS.  - Diet: Consistent Carbs.
- home medications: Lantus 100 units BID, Novolog 10 units TID pre meal. Patient's BS low in the ED yesterday   - A1C 7.9   - Increase Lantus to 50 Qhs + SSI for now, when PO intake improves, will up-titrate Insulin   - FS AC & HS.  - Diet: Consistent Carbs.  - f/u AM HbA1c.
- home medications: Lantus 100 units BID, Novolog 10 units TID pre meal.   - A1C 7.9   - cw Lantus to 50 Qhs + SSI for now, when PO intake improves, will up-titrate Insulin   - FS AC & HS.  - Diet: Consistent Carbs.
- home medications: Lantus 100 units BID, Novolog 10 units TID pre meal.   - A1C 7.9   - cw Lantus to 50 Qhs + SSI for now, when PO intake improves, will up-titrate Insulin   - FS AC & HS.  - Diet: Consistent Carbs.

## 2022-10-05 NOTE — DISCHARGE NOTE PROVIDER - CARE PROVIDER_API CALL
Dr Madhu  Please call and schedule outpatient follow up wityh your cardiologist with in 1 week of discharge for ongoing cardiac/medical management of your heart failure  Provider located in [place], [place]  Phone: (   )    -  Fax: (   )    -  Follow Up Time:     Primary Care Provider,   Please call and schedule outpatient follow up with your physician with in 1 week of discharge for ongoing cardiac/medical management   Provider located in [place], [place]  Phone: (   )    -  Fax: (   )    -  Follow Up Time:

## 2022-10-05 NOTE — DISCHARGE NOTE PROVIDER - HOSPITAL COURSE
48 y/o male with PMH paraplegia, opioid dependence and withdrawal, GERD, neuromuscular dysfunction of bladder with chronic indwelling Medina DM2, MDD, diastolic and systolic CHF, SALLIE, HTN, ileostomy, sacral ulcer presenting from [facility] for fever and back pain and flank pain admitted to medicine for the management of acute uti and back pain.    Pyelonephritis.   ·  Plan: Patient with positive u/a, fever, back pain   - UA+ for leuk esterase, nitrite   - Ucx 3+ organisms, possible contamination.   - Clinically improving, repeat urine culture with no growth.   - c/w Tylenol PRN pain and/or fever  - blood culture ngtd  - c/w Ertapenem 1g q24hr with plan for 7-10 day course for now (likely 9/30-10/6)  - trend WBC count.    Elevated troponin level.   ·  Plan: Patient with elevated Troponin of 58, 66  EKG , RBBB  CP free, suspect Type 2 in setting of sepsis   -TTE Endocardium not well visualized; grossly normal left ventricular systolic function.  Normal left ventricular diastolic function.  The right ventricle is not well visualized; grossly normal right ventricular systolic function  CXR - The base of the bilateral lungs is not imaged. Within the visualized portions of the lungs, there is no consolidation. No pneumothorax. No large pleural effusion within the limitations of exam.    Chronic combined systolic and diastolic heart failure.   ·  Plan: - h/o combined systolic/diastolic HF +1 pitting edema on Lasix 80 mg BID, Spironolactone, Metoprolol Tartrate 25 mg BID at rehab  - unknown status of CHF  -TTE Endocardium not well visualized; grossly normal left ventricular systolic function.  Normal left ventricular diastolic function.  The right ventricle is not well visualized; grossly normal right ventricular systolic function  - continue Lasix 80 mg BID for now  - Strict I&Os, monitor daily weights, 1500 cc fluid restriction, sodium restriction.  - CHF consult if needed.    Chronic back pain.   ·  Plan: - Pain control as clinically indicated - Continue with Gabapentin, Toradol, Baclofen and Cyclobenzaprine  - Pain improved for now, can trial PRN Tramadol if necessary  - Physical therapy: Back to [facility]  - LE dopplers with no findings of Thrombus.    Anemia.   ·  Plan: Patient with H/H 9.1/31.2 - Patient states he had blood transfusion approximately 6 months ago due to anemia- unclear where the source of bleeding was located. As per patient, it was sacral decubitus area  - Normal ferritin though TSAT low, will start iron every other day   - OP evaluation for iron deficiency with colonoscopy   - Will monitor H/H.    DM (diabetes mellitus).   ·  Plan: - home medications: Lantus 100 units BID, Novolog 10 units TID pre meal.   - A1C 7.9   - cw Lantus to 50 Qhs + SSI for now, when PO intake improves, will up-titrate Insulin   - FS AC & HS.  - Diet: Consistent Carbs.    HTN (hypertension).   ·  Plan: - Will continue with Metoprolol Tartrate 25 mg BID with holding parameters  - DASH diet  - c/w home meds w/ holding parameters  - monitor BP & titrate BP meds PRN.    MDD (major depressive disorder).   ·  Plan: - cont Duloxetine 60 mg QD from rehab.    SALLEI (obstructive sleep apnea).   ·  Plan: - Patient with SALLIE on CPAP @ night - does not know settings  - CPAP nightly.    Constipation.   ·  Plan; reports chronic constipation. takes senna bid, colace prn  Improved output on ostomy today  -cont with senna bid and miralax prn.    Need for prophylactic measure.   ·  Plan: - DVT prophylaxis: Lovenox 40mg SC QD  - Sacral Decubitus - wound consult, recs.      On ___ this case was reviewed with  ____, the patient is medically stable and optimized for discharge. All medications were reviewed and prescriptions were sent to mutually agreed upon pharmacy.   48 y/o male with PMH paraplegia, opioid dependence and withdrawal, GERD, neuromuscular dysfunction of bladder with chronic indwelling Medina DM2, MDD, diastolic and systolic CHF, SALLIE, HTN, ileostomy, sacral ulcer presenting from [facility] for fever and back pain and flank pain admitted to medicine for the management of acute uti and back pain.    Pyelonephritis.   ·  Plan: Patient with positive u/a, fever, back pain   - UA+ for leuk esterase, nitrite   - Ucx 3+ organisms, possible contamination.   - Clinically improving, repeat urine culture with no growth.   - c/w Tylenol PRN pain and/or fever  - blood culture ngtd  - trend WBC count  - Completed IV Ertapenem     Elevated troponin level.   ·  Plan: Patient with elevated Troponin of 58, 66  EKG , RBBB  CP free, suspect Type 2 in setting of sepsis   -TTE Endocardium not well visualized; grossly normal left ventricular systolic function.  Normal left ventricular diastolic function.  The right ventricle is not well visualized; grossly normal right ventricular systolic function  CXR - The base of the bilateral lungs is not imaged. Within the visualized portions of the lungs, there is no consolidation. No pneumothorax. No large pleural effusion within the limitations of exam.    Chronic combined systolic and diastolic heart failure.   ·  Plan: - h/o combined systolic/diastolic HF +1 pitting edema on Lasix 80 mg BID, Spironolactone, Metoprolol Tartrate 25 mg BID at rehab  - unknown status of CHF  -TTE Endocardium not well visualized; grossly normal left ventricular systolic function.  Normal left ventricular diastolic function.  The right ventricle is not well visualized; grossly normal right ventricular systolic function  - continue Lasix 80 mg BID for now  - Strict I&Os, monitor daily weights, 1500 cc fluid restriction, sodium restriction.  - CHF consult if needed.    Chronic back pain.   ·  Plan: - Pain control as clinically indicated - Continue with Gabapentin, Toradol, Baclofen and Cyclobenzaprine  - Pain improved for now, can trial PRN Tramadol if necessary  - Physical therapy: Back to [facility]  - LE dopplers with no findings of Thrombus.    Anemia.   ·  Plan: Patient with H/H 9.1/31.2 - Patient states he had blood transfusion approximately 6 months ago due to anemia- unclear where the source of bleeding was located. As per patient, it was sacral decubitus area  - Normal ferritin though TSAT low, will start iron every other day   - OP evaluation for iron deficiency with colonoscopy   - Will monitor H/H.    DM (diabetes mellitus).   ·  Plan: - home medications: Lantus 100 units BID, Novolog 10 units TID pre meal.   - A1C 7.9   - cw Lantus to 50 Qhs + SSI for now, when PO intake improves, will up-titrate Insulin   - FS AC & HS.  - Diet: Consistent Carbs.    HTN (hypertension).   ·  Plan: - Will continue with Metoprolol Tartrate 25 mg BID with holding parameters  - DASH diet  - c/w home meds w/ holding parameters  - monitor BP & titrate BP meds PRN.    MDD (major depressive disorder).   ·  Plan: - cont Duloxetine 60 mg QD from rehab.    SALLIE (obstructive sleep apnea).   ·  Plan: - Patient with SALLIE on CPAP @ night - does not know settings  - CPAP nightly.    Constipation.   ·  Plan; reports chronic constipation. takes senna bid, colace prn  Improved output on ostomy today  -cont with senna bid and miralax prn.    Need for prophylactic measure.   ·  Plan: - DVT prophylaxis: Lovenox 40mg SC QD  - Sacral Decubitus - wound consult, recs.    Patient seen and evaluated. Afebrile, VSS. Patient case reviewed and discussed with Attending Physician Dr Mendoza, who agrees the patient is medically stable and cleared for discharge 10/6, with appropriate outpatient follow up. Patient is from [facility] and will follow up with his HCP there after rehab. Reviewed discharge medications with patient and attending. All new medications requiring new prescriptions were sent to the pharmacy of patient's choice.  Patient understands and agrees with plan of care.   46 y/o male with PMH paraplegia, opioid dependence and withdrawal, GERD, neuromuscular dysfunction of bladder with chronic indwelling Medina DM2, MDD, diastolic and systolic CHF, SALLIE, HTN, ileostomy, sacral ulcer presenting from [facility] for fever and back pain and flank pain admitted to medicine for the management of acute uti and back pain.    Pyelonephritis.   ·  Plan: Patient with positive u/a, fever, back pain   - UA+ for leuk esterase, nitrite   - Ucx 3+ organisms, possible contamination.   - Clinically improving, repeat urine culture with no growth.   - c/w Tylenol PRN pain and/or fever  - blood culture ngtd  - Completed IV Ertapenem     Elevated troponin level.   ·  Plan: Patient with elevated Troponin of 58, 66  EKG , RBBB  CP free, suspect Type 2 in setting of sepsis   -TTE Endocardium not well visualized; grossly normal left ventricular systolic function.  Normal left ventricular diastolic function.  The right ventricle is not well visualized; grossly normal right ventricular systolic function  CXR - The base of the bilateral lungs is not imaged. Within the visualized portions of the lungs, there is no consolidation. No pneumothorax. No large pleural effusion within the limitations of exam.    Chronic combined systolic and diastolic heart failure.   ·  Plan: - h/o combined systolic/diastolic HF +1 pitting edema on Lasix 80 mg BID, Spironolactone, Metoprolol Tartrate 25 mg BID at rehab  - unknown status of CHF  -TTE Endocardium not well visualized; grossly normal left ventricular systolic function.  Normal left ventricular diastolic function.  The right ventricle is not well visualized; grossly normal right ventricular systolic function  - continue Lasix 80 mg BID for now  - Strict I&Os, monitor daily weights, 1500 cc fluid restriction, sodium restriction.  - f/u outpatient cardiologist     Chronic back pain.   ·  Plan: - Pain control as clinically indicated - Continue with Gabapentin, Toradol, Baclofen and Cyclobenzaprine  - Pain improved for now, can trial PRN Tramadol if necessary  - Physical therapy: Back to [facility]  - LE dopplers with no findings of Thrombus.    Anemia.   ·  Plan: Patient with H/H 9.1/31.2 - Patient states he had blood transfusion approximately 6 months ago due to anemia- unclear where the source of bleeding was located. As per patient, it was sacral decubitus area  - Normal ferritin though TSAT low, will start iron every other day   - OP evaluation for iron deficiency with colonoscopy   - Will monitor H/H.    DM (diabetes mellitus).   ·  Plan: - home medications: Lantus 100 units BID, Novolog 10 units TID pre meal.   - A1C 7.9   - cw Lantus to 50 Qhs + SSI for now, when PO intake improves, will up-titrate Insulin   - FS AC & HS.  - Diet: Consistent Carbs.    HTN (hypertension).   ·  Plan: - Will continue with Metoprolol Tartrate 25 mg BID with holding parameters  - DASH diet  - c/w home meds w/ holding parameters  - monitor BP & titrate BP meds PRN.    MDD (major depressive disorder).   ·  Plan: - cont Duloxetine 60 mg QD from rehab.    SALLIE (obstructive sleep apnea).   ·  Plan: - Patient with SALLIE on CPAP @ night - does not know settings  - CPAP nightly.    Constipation.   ·  Plan; reports chronic constipation. takes senna bid, colace prn  Improved output on ostomy today  -cont with senna bid and miralax prn.    Need for prophylactic measure.   ·  Plan: - DVT prophylaxis: Lovenox 40mg SC QD  - Sacral Decubitus - wound consult, recs.    Patient seen and evaluated. Afebrile, VSS. Patient case reviewed and discussed with Attending Physician Dr Mendoza, who agrees the patient is medically stable and cleared for discharge 10/6, with appropriate outpatient follow up. Patient is from [facility] and will follow up with his HCP there after rehab. Reviewed discharge medications with patient and attending. All new medications requiring new prescriptions were sent to the pharmacy of patient's choice.  Patient understands and agrees with plan of care.

## 2022-10-05 NOTE — DISCHARGE NOTE PROVIDER - NSDCADMDATE_GEN_ALL_CORE_FT
Problem: Cardiac Rhythm Disturbances with or without Devices  Goal: Hemodynamic stability achieved/maintained  Outcome: Outcome Met, Continue evaluating goal progress toward completion    NSR on telemetry. SBP  / DBP .      Problem: Potential for injury, Restraints  Goal: # Remains free from injury  Outcome: Outcome Met, Continue evaluating goal progress toward completion    Soft wrist bilateral restraints in use. Order rnewed on 4/10 - 15:50. Q2h monitoring - no injuries noted. CPM     Problem: Mental Status, Alterations (Non-Delirium)  Goal: Mental Status is maintained/improved from status at baseline  Outcome: Outcome Not Met, Continue to Monitor    Pt remains obtunded but became restless and irritable in the AM PRN seroquel given via NGT - NPO. Not responsive to questions/commands. Baseline per SNF RN is aox3 with mechanical soft diet. CPM     Problem: At Risk for Falls  Goal: # Patient does not fall  4/11/2022 0327 by David Atkins RN  Outcome: Outcome Met, Continue evaluating goal progress toward completion    Bed alarm in use. Monitor.         30-Sep-2022 07:04

## 2022-10-05 NOTE — PROGRESS NOTE ADULT - PROBLEM SELECTOR PLAN 8
- cont Duloxetine 60 mg QD from rehab

## 2022-10-05 NOTE — PROGRESS NOTE ADULT - PROBLEM SELECTOR PLAN 11
- DVT prophylaxis: Lovenox 40mg SC QD  - Sacral Decubitus - wound consult, recs
- DVT prophylaxis: Lovenox 40mg SC QD  - Sacral Decubitus - wound consult, recs
- DVT prophylaxis: Lovenox 40mg SC QD  - Sacral Decubitus - Will obtain Wound Consult for treatment recommendation.

## 2022-10-05 NOTE — PROGRESS NOTE ADULT - PROBLEM SELECTOR PLAN 5
Patient with H/H 9.1/31.2 - Patient states he had blood transfusion approximately 6 months ago due to anemia- unclear where the source of bleeding was located. As per patient, it was sacral decubitus area  - Normal ferritin though TSAT low, will start iron every other day   [ ] OP evaluation for iron deficiency with colonoscopy   - Will monitor H/H.

## 2022-10-06 VITALS
TEMPERATURE: 98 F | DIASTOLIC BLOOD PRESSURE: 65 MMHG | SYSTOLIC BLOOD PRESSURE: 124 MMHG | RESPIRATION RATE: 17 BRPM | OXYGEN SATURATION: 98 % | HEART RATE: 54 BPM

## 2022-10-06 LAB
ANION GAP SERPL CALC-SCNC: 9 MMOL/L — SIGNIFICANT CHANGE UP (ref 7–14)
BUN SERPL-MCNC: 10 MG/DL — SIGNIFICANT CHANGE UP (ref 7–23)
CALCIUM SERPL-MCNC: 8.3 MG/DL — LOW (ref 8.4–10.5)
CHLORIDE SERPL-SCNC: 97 MMOL/L — LOW (ref 98–107)
CO2 SERPL-SCNC: 26 MMOL/L — SIGNIFICANT CHANGE UP (ref 22–31)
CREAT SERPL-MCNC: 0.68 MG/DL — SIGNIFICANT CHANGE UP (ref 0.5–1.3)
EGFR: 117 ML/MIN/1.73M2 — SIGNIFICANT CHANGE UP
GLUCOSE SERPL-MCNC: 292 MG/DL — HIGH (ref 70–99)
HCT VFR BLD CALC: 30.1 % — LOW (ref 39–50)
HGB BLD-MCNC: 8.7 G/DL — LOW (ref 13–17)
MAGNESIUM SERPL-MCNC: 2 MG/DL — SIGNIFICANT CHANGE UP (ref 1.6–2.6)
MCHC RBC-ENTMCNC: 23.7 PG — LOW (ref 27–34)
MCHC RBC-ENTMCNC: 28.9 GM/DL — LOW (ref 32–36)
MCV RBC AUTO: 82 FL — SIGNIFICANT CHANGE UP (ref 80–100)
NRBC # BLD: 0 /100 WBCS — SIGNIFICANT CHANGE UP (ref 0–0)
NRBC # FLD: 0 K/UL — SIGNIFICANT CHANGE UP (ref 0–0)
PHOSPHATE SERPL-MCNC: 3.3 MG/DL — SIGNIFICANT CHANGE UP (ref 2.5–4.5)
PLATELET # BLD AUTO: 135 K/UL — LOW (ref 150–400)
POTASSIUM SERPL-MCNC: 5 MMOL/L — SIGNIFICANT CHANGE UP (ref 3.5–5.3)
POTASSIUM SERPL-SCNC: 5 MMOL/L — SIGNIFICANT CHANGE UP (ref 3.5–5.3)
RBC # BLD: 3.67 M/UL — LOW (ref 4.2–5.8)
RBC # FLD: 17.2 % — HIGH (ref 10.3–14.5)
SARS-COV-2 RNA SPEC QL NAA+PROBE: SIGNIFICANT CHANGE UP
SODIUM SERPL-SCNC: 132 MMOL/L — LOW (ref 135–145)
WBC # BLD: 3.27 K/UL — LOW (ref 3.8–10.5)
WBC # FLD AUTO: 3.27 K/UL — LOW (ref 3.8–10.5)

## 2022-10-06 PROCEDURE — 99239 HOSP IP/OBS DSCHRG MGMT >30: CPT

## 2022-10-06 RX ORDER — FUROSEMIDE 40 MG
1 TABLET ORAL
Qty: 0 | Refills: 0 | DISCHARGE
Start: 2022-10-06

## 2022-10-06 RX ORDER — DIVALPROEX SODIUM 500 MG/1
1 TABLET, DELAYED RELEASE ORAL
Qty: 0 | Refills: 0 | DISCHARGE
Start: 2022-10-06

## 2022-10-06 RX ORDER — DULOXETINE HYDROCHLORIDE 30 MG/1
0 CAPSULE, DELAYED RELEASE ORAL
Qty: 0 | Refills: 0 | DISCHARGE

## 2022-10-06 RX ORDER — DULOXETINE HYDROCHLORIDE 30 MG/1
1 CAPSULE, DELAYED RELEASE ORAL
Qty: 0 | Refills: 0 | DISCHARGE
Start: 2022-10-06

## 2022-10-06 RX ORDER — INSULIN GLARGINE 100 [IU]/ML
50 INJECTION, SOLUTION SUBCUTANEOUS
Qty: 0 | Refills: 0 | DISCHARGE

## 2022-10-06 RX ORDER — METOPROLOL TARTRATE 50 MG
0 TABLET ORAL
Qty: 0 | Refills: 1 | DISCHARGE

## 2022-10-06 RX ORDER — FERROUS SULFATE 325(65) MG
1 TABLET ORAL
Qty: 0 | Refills: 0 | DISCHARGE
Start: 2022-10-06

## 2022-10-06 RX ORDER — CYCLOBENZAPRINE HYDROCHLORIDE 10 MG/1
0 TABLET, FILM COATED ORAL
Qty: 0 | Refills: 0 | DISCHARGE

## 2022-10-06 RX ORDER — BACLOFEN 100 %
0 POWDER (GRAM) MISCELLANEOUS
Qty: 0 | Refills: 0 | DISCHARGE

## 2022-10-06 RX ORDER — DIVALPROEX SODIUM 500 MG/1
0 TABLET, DELAYED RELEASE ORAL
Qty: 0 | Refills: 0 | DISCHARGE

## 2022-10-06 RX ORDER — LANOLIN ALCOHOL/MO/W.PET/CERES
2 CREAM (GRAM) TOPICAL
Qty: 0 | Refills: 0 | DISCHARGE
Start: 2022-10-06

## 2022-10-06 RX ORDER — GABAPENTIN 400 MG/1
0 CAPSULE ORAL
Qty: 0 | Refills: 10 | DISCHARGE

## 2022-10-06 RX ORDER — TRAMADOL HYDROCHLORIDE 50 MG/1
50 TABLET ORAL EVERY 8 HOURS
Refills: 0 | Status: DISCONTINUED | OUTPATIENT
Start: 2022-10-06 | End: 2022-10-06

## 2022-10-06 RX ORDER — ACETAMINOPHEN 500 MG
2 TABLET ORAL
Qty: 0 | Refills: 0 | DISCHARGE
Start: 2022-10-06

## 2022-10-06 RX ORDER — CHOLECALCIFEROL (VITAMIN D3) 125 MCG
1 CAPSULE ORAL
Qty: 0 | Refills: 0 | DISCHARGE

## 2022-10-06 RX ORDER — ACETAMINOPHEN 500 MG
0 TABLET ORAL
Qty: 0 | Refills: 1 | DISCHARGE

## 2022-10-06 RX ORDER — SENNA PLUS 8.6 MG/1
2 TABLET ORAL
Qty: 0 | Refills: 0 | DISCHARGE

## 2022-10-06 RX ORDER — METOPROLOL TARTRATE 50 MG
1 TABLET ORAL
Qty: 0 | Refills: 0 | DISCHARGE
Start: 2022-10-06

## 2022-10-06 RX ORDER — CYCLOBENZAPRINE HYDROCHLORIDE 10 MG/1
1 TABLET, FILM COATED ORAL
Qty: 0 | Refills: 0 | DISCHARGE
Start: 2022-10-06

## 2022-10-06 RX ORDER — TRAMADOL HYDROCHLORIDE 50 MG/1
1 TABLET ORAL
Qty: 0 | Refills: 0 | DISCHARGE

## 2022-10-06 RX ORDER — TRAMADOL HYDROCHLORIDE 50 MG/1
1 TABLET ORAL
Qty: 0 | Refills: 0 | DISCHARGE
Start: 2022-10-06

## 2022-10-06 RX ORDER — INSULIN GLARGINE 100 [IU]/ML
100 INJECTION, SOLUTION SUBCUTANEOUS
Qty: 0 | Refills: 0 | DISCHARGE

## 2022-10-06 RX ORDER — OXYCODONE HYDROCHLORIDE 5 MG/1
1 TABLET ORAL
Qty: 0 | Refills: 0 | DISCHARGE
Start: 2022-10-06

## 2022-10-06 RX ORDER — OXYCODONE HYDROCHLORIDE 5 MG/1
30 TABLET ORAL EVERY 6 HOURS
Refills: 0 | Status: DISCONTINUED | OUTPATIENT
Start: 2022-10-06 | End: 2022-10-06

## 2022-10-06 RX ORDER — BACLOFEN 100 %
1 POWDER (GRAM) MISCELLANEOUS
Qty: 0 | Refills: 0 | DISCHARGE
Start: 2022-10-06

## 2022-10-06 RX ORDER — POLYETHYLENE GLYCOL 3350 17 G/17G
17 POWDER, FOR SOLUTION ORAL
Qty: 0 | Refills: 0 | DISCHARGE
Start: 2022-10-06

## 2022-10-06 RX ORDER — SENNA PLUS 8.6 MG/1
2 TABLET ORAL
Qty: 0 | Refills: 0 | DISCHARGE
Start: 2022-10-06

## 2022-10-06 RX ORDER — CHOLECALCIFEROL (VITAMIN D3) 125 MCG
1000 CAPSULE ORAL
Qty: 0 | Refills: 0 | DISCHARGE
Start: 2022-10-06

## 2022-10-06 RX ORDER — SPIRONOLACTONE 25 MG/1
0 TABLET, FILM COATED ORAL
Qty: 0 | Refills: 0 | DISCHARGE

## 2022-10-06 RX ORDER — FUROSEMIDE 40 MG
0 TABLET ORAL
Qty: 0 | Refills: 0 | DISCHARGE

## 2022-10-06 RX ORDER — LANOLIN ALCOHOL/MO/W.PET/CERES
1 CREAM (GRAM) TOPICAL
Qty: 0 | Refills: 0 | DISCHARGE

## 2022-10-06 RX ORDER — GABAPENTIN 400 MG/1
1 CAPSULE ORAL
Qty: 0 | Refills: 0 | DISCHARGE
Start: 2022-10-06

## 2022-10-06 RX ADMIN — DIVALPROEX SODIUM 500 MILLIGRAM(S): 500 TABLET, DELAYED RELEASE ORAL at 05:39

## 2022-10-06 RX ADMIN — GABAPENTIN 400 MILLIGRAM(S): 400 CAPSULE ORAL at 05:37

## 2022-10-06 RX ADMIN — ERTAPENEM SODIUM 120 MILLIGRAM(S): 1 INJECTION, POWDER, LYOPHILIZED, FOR SOLUTION INTRAMUSCULAR; INTRAVENOUS at 05:39

## 2022-10-06 RX ADMIN — OXYCODONE HYDROCHLORIDE 30 MILLIGRAM(S): 5 TABLET ORAL at 06:06

## 2022-10-06 RX ADMIN — OXYCODONE HYDROCHLORIDE 30 MILLIGRAM(S): 5 TABLET ORAL at 12:59

## 2022-10-06 RX ADMIN — Medication 4: at 08:27

## 2022-10-06 RX ADMIN — TRAMADOL HYDROCHLORIDE 50 MILLIGRAM(S): 50 TABLET ORAL at 06:40

## 2022-10-06 RX ADMIN — OXYCODONE HYDROCHLORIDE 30 MILLIGRAM(S): 5 TABLET ORAL at 05:36

## 2022-10-06 RX ADMIN — SENNA PLUS 2 TABLET(S): 8.6 TABLET ORAL at 05:39

## 2022-10-06 RX ADMIN — Medication 1000 UNIT(S): at 12:38

## 2022-10-06 RX ADMIN — Medication 20 MILLIGRAM(S): at 12:38

## 2022-10-06 RX ADMIN — Medication 25 MILLIGRAM(S): at 05:39

## 2022-10-06 RX ADMIN — Medication 3: at 12:37

## 2022-10-06 RX ADMIN — CYCLOBENZAPRINE HYDROCHLORIDE 10 MILLIGRAM(S): 10 TABLET, FILM COATED ORAL at 05:37

## 2022-10-06 RX ADMIN — DULOXETINE HYDROCHLORIDE 60 MILLIGRAM(S): 30 CAPSULE, DELAYED RELEASE ORAL at 12:38

## 2022-10-06 RX ADMIN — Medication 80 MILLIGRAM(S): at 05:39

## 2022-10-06 NOTE — DISCHARGE NOTE NURSING/CASE MANAGEMENT/SOCIAL WORK - NSDCPEFALRISK_GEN_ALL_CORE
For information on Fall & Injury Prevention, visit: [email or web address] OR  [email or web address] OR  https://www.cdc.gov/steadi/patient.html

## 2024-02-13 NOTE — H&P ADULT - ASSESSMENT
Procedure Report    
Date of Procedure: 02/09/24    
    
Assessment & Plan    
Assessment/Plan    
(1) Cirrhosis:     
QUALIFIERS:               Ascites presence: with ascites  Hepatic cirrhosis   
type: unspecified hepatic cirrhosis  Qualified Code(s): K74.60 - Unspecified   
cirrhosis of liver; R18.8 - Other ascites    
PLAN:     
PROCEDURE: Ultrasound guided paracentesis    
    
ORDERING PROVIDER: Dr. Spicer    
    
INDICATION: Male, 86 years old.  Abdominal ascites.    
    
PROVIDER: NUBIA Cabrales    
_______________________________________    
    
TECHNIQUE:    
    
The risks, benefits, and alternatives to the procedure were explained to the pa  
tient and patient's daughter via telephone. The specific risks of bleeding,   
infection, and damage to bowel were detailed and accepted.  Telephone consent   
was obtained from the daughter.    
    
The abdomen was ultrasonographically surveyed. An appropriate pocket of fluid   
was identified in the right upper quadrant. The skin was prepped with   
chlorhexidine and sterile field established.  2% lidocaine was used for local   
anesthetic.      
    
Using ultrasound guidance, the peritoneal cavity was accessed with a 5-Romanian   
paracentesis needle/catheter system. The trocar was removed. A total of 8200 ml   
of clear yellow colored fluid was removed from the peritoneal cavity.  100 mL of  
this fluid was sent to the laboratory for analysis.  Despite fluid remaining on   
ultrasound, the catheter was withdrawn at 8200 mL because of the patient's soft   
blood pressure.  A sterile dressing was applied.     
    
The procedure was well tolerated.    
_________________________________________    
    
IMPRESSION:    
    
Successful ultrasound-guided paracentesis with right upper quadrant access site.    
    
8200 mL was drained; however, a small amount of ascites did remain.    
    
Procedures Radiology    
Radiology US Procedures: [id number] Paracentesis
Procedure Report    
Date of Procedure: 02/13/24    
    
Assessment & Plan    
Assessment/Plan    
(1) Ascites:     
QUALIFIERS:               Ascites type: malignant  Qualified Code(s): R18.0 -   
Malignant ascites    
PLAN:     
PROCEDURE: Ultrasound guided paracentesis    
    
ORDERING PROVIDER: Dr. Hancock    
    
INDICATION: Male, 86 years old.  Ascites with history of liver cancer.    
    
PROVIDER: NUBIA Cabrales    
_______________________________________    
    
TECHNIQUE:    
    
The risks, benefits, and alternatives to the procedure were explained to the   
patient and daughter via telephone.  Consent for procedure was obtained from   
daughter over the phone.. The specific risks of bleeding, infection, and damage   
to bowel were detailed and accepted. Witnessed informed consent was obtained.    
    
The abdomen was ultrasonographically surveyed. An appropriate pocket of fluid   
was identified in the right upper quadrant. The skin was prepped with   
chlorhexidine and sterile field established.  2% lidocaine was used for local   
anesthetic.      
    
Using ultrasound guidance, the peritoneal cavity was accessed with a 5-Mohawk   
paracentesis needle/catheter system. The trocar was removed. A total of 6300 ml   
of clear yellow colored fluid was removed from the peritoneal cavity. The   
catheter was removed and a sterile dressing was applied.    
    
The procedure was well tolerated.    
_________________________________________    
    
IMPRESSION:    
    
Successful ultrasound-guided paracentesis with right upper quadrant access site.    
    
Procedures Radiology    
Radiology US Procedures: [id number] Paracentesis
46 y/o male with PMH paraplegia, opioid dependence and withdrawal, GERD, neuromuscular dysfunction of bladder with chronic indwelling Medina DM2, MDD, diastolic and systolic CHF, SALLIE, HTN, ileostomy, sacral ulcer [facility] for fever and back pain and flank pain admitted to medicine for the management of acute uti and back pain.